# Patient Record
Sex: MALE | Race: WHITE | URBAN - METROPOLITAN AREA
[De-identification: names, ages, dates, MRNs, and addresses within clinical notes are randomized per-mention and may not be internally consistent; named-entity substitution may affect disease eponyms.]

---

## 2021-06-17 ENCOUNTER — OFFICE VISIT (OUTPATIENT)
Dept: PRIMARY CARE CLINIC | Age: 46
End: 2021-06-17
Payer: MEDICAID

## 2021-06-17 VITALS
HEART RATE: 82 BPM | BODY MASS INDEX: 28.28 KG/M2 | OXYGEN SATURATION: 98 % | DIASTOLIC BLOOD PRESSURE: 75 MMHG | HEIGHT: 71 IN | WEIGHT: 202 LBS | TEMPERATURE: 97.7 F | SYSTOLIC BLOOD PRESSURE: 115 MMHG

## 2021-06-17 DIAGNOSIS — Z00.00 ANNUAL PHYSICAL EXAM: Primary | ICD-10-CM

## 2021-06-17 DIAGNOSIS — Z87.891 SMOKING HISTORY: ICD-10-CM

## 2021-06-17 DIAGNOSIS — F10.10 ALCOHOL ABUSE: ICD-10-CM

## 2021-06-17 PROCEDURE — 99386 PREV VISIT NEW AGE 40-64: CPT | Performed by: NURSE PRACTITIONER

## 2021-06-17 ASSESSMENT — PATIENT HEALTH QUESTIONNAIRE - PHQ9
1. LITTLE INTEREST OR PLEASURE IN DOING THINGS: 0
SUM OF ALL RESPONSES TO PHQ9 QUESTIONS 1 & 2: 0
SUM OF ALL RESPONSES TO PHQ QUESTIONS 1-9: 0
2. FEELING DOWN, DEPRESSED OR HOPELESS: 0
SUM OF ALL RESPONSES TO PHQ QUESTIONS 1-9: 0
SUM OF ALL RESPONSES TO PHQ QUESTIONS 1-9: 0

## 2021-06-17 NOTE — PATIENT INSTRUCTIONS
Patient Education        Well Visit, Ages 25 to 48: Care Instructions  Overview     Well visits can help you stay healthy. Your doctor has checked your overall health and may have suggested ways to take good care of yourself. Your doctor also may have recommended tests. At home, you can help prevent illness with healthy eating, regular exercise, and other steps. Follow-up care is a key part of your treatment and safety. Be sure to make and go to all appointments, and call your doctor if you are having problems. It's also a good idea to know your test results and keep a list of the medicines you take. How can you care for yourself at home? · Get screening tests that you and your doctor decide on. Screening helps find diseases before any symptoms appear. · Eat healthy foods. Choose fruits, vegetables, whole grains, protein, and low-fat dairy foods. Limit fat, especially saturated fat. Reduce salt in your diet. · Limit alcohol. If you are a man, have no more than 2 drinks a day or 14 drinks a week. If you are a woman, have no more than 1 drink a day or 7 drinks a week. · Get at least 30 minutes of physical activity on most days of the week. Walking is a good choice. You also may want to do other activities, such as running, swimming, cycling, or playing tennis or team sports. Discuss any changes in your exercise program with your doctor. · Reach and stay at a healthy weight. This will lower your risk for many problems, such as obesity, diabetes, heart disease, and high blood pressure. · Do not smoke or allow others to smoke around you. If you need help quitting, talk to your doctor about stop-smoking programs and medicines. These can increase your chances of quitting for good. · Care for your mental health. It is easy to get weighed down by worry and stress. Learn strategies to manage stress, like deep breathing and mindfulness, and stay connected with your family and community.  If you find you often feel sad or hopeless, talk with your doctor. Treatment can help. · Talk to your doctor about whether you have any risk factors for sexually transmitted infections (STIs). You can help prevent STIs if you wait to have sex with a new partner (or partners) until you've each been tested for STIs. It also helps if you use condoms (male or female condoms) and if you limit your sex partners to one person who only has sex with you. Vaccines are available for some STIs, such as HPV. · Use birth control if it's important to you to prevent pregnancy. Talk with your doctor about the choices available and what might be best for you. · If you think you may have a problem with alcohol or drug use, talk to your doctor. This includes prescription medicines (such as amphetamines and opioids) and illegal drugs (such as cocaine and methamphetamine). Your doctor can help you figure out what type of treatment is best for you. · Protect your skin from too much sun. When you're outdoors from 10 a.m. to 4 p.m., stay in the shade or cover up with clothing and a hat with a wide brim. Wear sunglasses that block UV rays. Even when it's cloudy, put broad-spectrum sunscreen (SPF 30 or higher) on any exposed skin. · See a dentist one or two times a year for checkups and to have your teeth cleaned. · Wear a seat belt in the car. When should you call for help? Watch closely for changes in your health, and be sure to contact your doctor if you have any problems or symptoms that concern you. Where can you learn more? Go to https://maxi.healthGroup Therapy [email or web address] org and sign in to your Busbud account. Enter P072 in the Vital [employer]ms box to learn more about \"Well Visit, Ages 25 to 48: Care Instructions. \"     If you do not have an account, please click on the \"Sign Up Now\" link. Current as of: May 27, 2020               Content Version: 12.9  © 0201-5987 Healthwise, Incorporated. Care instructions adapted under license by [facility] ([facility]). If you have questions about a medical condition or this instruction, always ask your healthcare professional. [place] [address] any warranty or liability for your use of this information.
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
## 2021-06-29 ENCOUNTER — TELEPHONE (OUTPATIENT)
Dept: PHARMACY | Age: 46
End: 2021-06-29

## 2021-06-29 NOTE — TELEPHONE ENCOUNTER
Received new referral to [facility] from Netta Walters CNP for smoking cessation. Call placed to patient to schedule initial visit. Left message with instructions for patient to call the clinic back.      Dino Oleary, PharmD, BCACP  Ph: [phone]

## 2021-07-06 ENCOUNTER — OFFICE VISIT (OUTPATIENT)
Dept: PRIMARY CARE CLINIC | Age: 46
End: 2021-07-06
Payer: MEDICAID

## 2021-07-06 VITALS
BODY MASS INDEX: 28.98 KG/M2 | HEART RATE: 76 BPM | DIASTOLIC BLOOD PRESSURE: 68 MMHG | WEIGHT: 207 LBS | TEMPERATURE: 98.2 F | HEIGHT: 71 IN | SYSTOLIC BLOOD PRESSURE: 108 MMHG

## 2021-07-06 DIAGNOSIS — L05.01 PILONIDAL CYST WITH ABSCESS: Primary | ICD-10-CM

## 2021-07-06 PROCEDURE — 4004F PT TOBACCO SCREEN RCVD TLK: CPT | Performed by: NURSE PRACTITIONER

## 2021-07-06 PROCEDURE — G8427 DOCREV CUR MEDS BY ELIG CLIN: HCPCS | Performed by: NURSE PRACTITIONER

## 2021-07-06 PROCEDURE — 99213 OFFICE O/P EST LOW 20 MIN: CPT | Performed by: NURSE PRACTITIONER

## 2021-07-06 PROCEDURE — G8419 CALC BMI OUT NRM PARAM NOF/U: HCPCS | Performed by: NURSE PRACTITIONER

## 2021-07-06 RX ORDER — AMOXICILLIN AND CLAVULANATE POTASSIUM 875; 125 MG/1; MG/1
1 TABLET, FILM COATED ORAL 2 TIMES DAILY
Qty: 20 TABLET | Refills: 0 | Status: SHIPPED | OUTPATIENT
Start: 2021-07-06 | End: 2021-07-16

## 2021-07-06 RX ORDER — SULFAMETHOXAZOLE AND TRIMETHOPRIM 800; 160 MG/1; MG/1
1 TABLET ORAL 2 TIMES DAILY
Qty: 14 TABLET | Refills: 0 | Status: SHIPPED | OUTPATIENT
Start: 2021-07-06 | End: 2021-07-06 | Stop reason: ALTCHOICE

## 2021-07-06 ASSESSMENT — ENCOUNTER SYMPTOMS
COUGH: 0
ABDOMINAL PAIN: 0
NAUSEA: 0
SHORTNESS OF BREATH: 0
WHEEZING: 0

## 2021-07-06 NOTE — PATIENT INSTRUCTIONS
Patient Education        Skin Abscess: Care Instructions  Your Care Instructions     A skin abscess is a bacterial infection that forms a pocket of pus. A boil is a kind of skin abscess. The doctor may have cut an opening in the abscess so that the pus can drain out. You may have gauze in the cut so that the abscess will stay open and keep draining. You may need antibiotics. You will need to follow up with your doctor to make sure the infection has gone away. The doctor has checked you carefully, but problems can develop later. If you notice any problems or new symptoms, get medical treatment right away. Follow-up care is a key part of your treatment and safety. Be sure to make and go to all appointments, and call your doctor if you are having problems. It's also a good idea to know your test results and keep a list of the medicines you take. How can you care for yourself at home? · Apply warm and dry compresses, a heating pad set on low, or a hot water bottle 3 or 4 times a day for pain. Keep a cloth between the heat source and your skin. · If your doctor prescribed antibiotics, take them as directed. Do not stop taking them just because you feel better. You need to take the full course of antibiotics. · Take pain medicines exactly as directed. ? If the doctor gave you a prescription medicine for pain, take it as prescribed. ? If you are not taking a prescription pain medicine, ask your doctor if you can take an over-the-counter medicine. · Keep your bandage clean and dry. Change the bandage whenever it gets wet or dirty, or at least one time a day. · If the abscess was packed with gauze:  ? Keep follow-up appointments to have the gauze changed or removed. If the doctor instructed you to remove the gauze, follow the instructions you were given for how to remove it. ? After the gauze is removed, soak the area in warm water for 15 to 20 minutes 2 times a day, until the wound closes.   When should you call for help? Call your doctor now or seek immediate medical care if:    · You have signs of worsening infection, such as:  ? Increased pain, swelling, warmth, or redness. ? Red streaks leading from the infected skin. ? Pus draining from the wound. ? A fever. Watch closely for changes in your health, and be sure to contact your doctor if:    · You do not get better as expected. Where can you learn more? Go to https://Flash Valetpepiceweb.Graphenea. org and sign in to your Spark CRM account. Enter F736 in the uShare box to learn more about \"Skin Abscess: Care Instructions. \"     If you do not have an account, please click on the \"Sign Up Now\" link. Current as of: March 3, 2021               Content Version: 12.9  © 9540-4949 Healthwise, Incorporated. Care instructions adapted under license by [facility] ([facility]). If you have questions about a medical condition or this instruction, always ask your healthcare professional. [place] [address] any warranty or liability for your use of this information.
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
## 2021-07-12 ENCOUNTER — OFFICE VISIT (OUTPATIENT)
Dept: SURGERY | Age: 46
End: 2021-07-12
Payer: MEDICAID

## 2021-07-12 VITALS
BODY MASS INDEX: 28.98 KG/M2 | WEIGHT: 207 LBS | HEART RATE: 88 BPM | OXYGEN SATURATION: 98 % | SYSTOLIC BLOOD PRESSURE: 108 MMHG | DIASTOLIC BLOOD PRESSURE: 73 MMHG | HEIGHT: 71 IN

## 2021-07-12 DIAGNOSIS — L05.91 PILONIDAL CYST: Primary | ICD-10-CM

## 2021-07-12 PROCEDURE — G8419 CALC BMI OUT NRM PARAM NOF/U: HCPCS | Performed by: SURGERY

## 2021-07-12 PROCEDURE — G8427 DOCREV CUR MEDS BY ELIG CLIN: HCPCS | Performed by: SURGERY

## 2021-07-12 PROCEDURE — 99203 OFFICE O/P NEW LOW 30 MIN: CPT | Performed by: SURGERY

## 2021-07-12 PROCEDURE — 4004F PT TOBACCO SCREEN RCVD TLK: CPT | Performed by: SURGERY

## 2021-07-27 DIAGNOSIS — Z00.00 ANNUAL PHYSICAL EXAM: ICD-10-CM

## 2021-07-27 LAB
A/G RATIO: 1.8 (ref 1.1–2.2)
ALBUMIN SERPL-MCNC: 4.6 G/DL (ref 3.4–5)
ALP BLD-CCNC: 74 U/L (ref 40–129)
ALT SERPL-CCNC: 48 U/L (ref 10–40)
ANION GAP SERPL CALCULATED.3IONS-SCNC: 14 MMOL/L (ref 3–16)
AST SERPL-CCNC: 37 U/L (ref 15–37)
BASOPHILS ABSOLUTE: 0.1 K/UL (ref 0–0.2)
BASOPHILS RELATIVE PERCENT: 1.2 %
BILIRUB SERPL-MCNC: 0.3 MG/DL (ref 0–1)
BUN BLDV-MCNC: 9 MG/DL (ref 7–20)
CALCIUM SERPL-MCNC: 8.7 MG/DL (ref 8.3–10.6)
CHLORIDE BLD-SCNC: 105 MMOL/L (ref 99–110)
CHOLESTEROL, FASTING: 183 MG/DL (ref 0–199)
CO2: 22 MMOL/L (ref 21–32)
CREAT SERPL-MCNC: 0.7 MG/DL (ref 0.9–1.3)
EOSINOPHILS ABSOLUTE: 0.2 K/UL (ref 0–0.6)
EOSINOPHILS RELATIVE PERCENT: 4.2 %
GFR AFRICAN AMERICAN: >60
GFR NON-AFRICAN AMERICAN: >60
GLOBULIN: 2.6 G/DL
GLUCOSE FASTING: 102 MG/DL (ref 70–99)
HCT VFR BLD CALC: 46.1 % (ref 40.5–52.5)
HDLC SERPL-MCNC: 57 MG/DL (ref 40–60)
HEMOGLOBIN: 16.1 G/DL (ref 13.5–17.5)
LDL CHOLESTEROL CALCULATED: 99 MG/DL
LYMPHOCYTES ABSOLUTE: 1.6 K/UL (ref 1–5.1)
LYMPHOCYTES RELATIVE PERCENT: 29 %
MCH RBC QN AUTO: 34.2 PG (ref 26–34)
MCHC RBC AUTO-ENTMCNC: 34.9 G/DL (ref 31–36)
MCV RBC AUTO: 97.9 FL (ref 80–100)
MONOCYTES ABSOLUTE: 0.4 K/UL (ref 0–1.3)
MONOCYTES RELATIVE PERCENT: 7.5 %
NEUTROPHILS ABSOLUTE: 3.2 K/UL (ref 1.7–7.7)
NEUTROPHILS RELATIVE PERCENT: 58.1 %
PDW BLD-RTO: 13.5 % (ref 12.4–15.4)
PLATELET # BLD: 215 K/UL (ref 135–450)
PMV BLD AUTO: 7.9 FL (ref 5–10.5)
POTASSIUM SERPL-SCNC: 4.3 MMOL/L (ref 3.5–5.1)
RBC # BLD: 4.7 M/UL (ref 4.2–5.9)
SODIUM BLD-SCNC: 141 MMOL/L (ref 136–145)
TOTAL PROTEIN: 7.2 G/DL (ref 6.4–8.2)
TRIGLYCERIDE, FASTING: 137 MG/DL (ref 0–150)
VLDLC SERPL CALC-MCNC: 27 MG/DL
WBC # BLD: 5.6 K/UL (ref 4–11)

## 2021-07-28 LAB
ESTIMATED AVERAGE GLUCOSE: 108.3 MG/DL
HBA1C MFR BLD: 5.4 %

## 2021-07-28 NOTE — TELEPHONE ENCOUNTER
Scheduled 9/7. Pt given address and phone number to clinic.      Tracy Garcia, PharmD, [address] [facility]  Med[facility] Management Clinic   [facility] Ph: [phone]  [address] Ph: [phone]  7/28/2021 3:19 PM

## 2021-07-29 ENCOUNTER — TELEPHONE (OUTPATIENT)
Dept: PRIMARY CARE CLINIC | Age: 46
End: 2021-07-29

## 2021-07-29 NOTE — TELEPHONE ENCOUNTER
Just took a look at them and it looks like everything looks great. There is a slight elevation in one of the liver function test that is not terribly concerning. Liver function test can be mildly elevated secondary to fatty infiltration of the liver, over-the-counter medications such as Tylenol and alcohol consumption.
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
## 2021-08-03 NOTE — TELEPHONE ENCOUNTER
Please let patient know I reviewed his labs. Everything looked fine. One of his liver enzymes tests was mildly elevated but nothing concerning at this time. I would recommend he decrease alcohol consumption and increase intake of water. Other than that, no other recommendations at this time! He can follow up in Dec at his 6 month check up and we can re-check labs at that time.

## 2021-09-21 ENCOUNTER — OFFICE VISIT (OUTPATIENT)
Dept: PHARMACY | Age: 46
End: 2021-09-21
Payer: MEDICAID

## 2021-09-21 DIAGNOSIS — F17.210 CIGARETTE NICOTINE DEPENDENCE WITHOUT COMPLICATION: Primary | ICD-10-CM

## 2021-09-21 PROCEDURE — 99212 OFFICE O/P EST SF 10 MIN: CPT | Performed by: PHARMACIST

## 2021-09-21 RX ORDER — POLYETHYLENE GLYCOL 3350 17 G
4 POWDER IN PACKET (EA) ORAL PRN
Qty: 72 EACH | Refills: 3 | Status: SHIPPED | OUTPATIENT
Start: 2021-09-21 | End: 2021-11-16 | Stop reason: SINTOL

## 2021-09-21 RX ORDER — POLYETHYLENE GLYCOL 3350 17 G
4 POWDER IN PACKET (EA) ORAL PRN
Qty: 72 EACH | Refills: 3 | Status: CANCELLED | OUTPATIENT
Start: 2021-09-21

## 2021-09-21 NOTE — PATIENT INSTRUCTIONS
- Switch brand of cigarettes   - Change up morning routine, don't walk dog first thing in the morning   - Use lozenges instead of smoking cigarette   - Only smoke half of cigarette when with friends   - Don't smoke in car   - Try to cut back to 5 cigarettes per day by next appointment

## 2021-09-21 NOTE — Clinical Note
Otoniel Jacques, patient was seen in smoking cessation clinic today. He would like to try nicotine lozenges. Can you please sign pended Rx if agreeable with plan? Thanks!

## 2021-09-21 NOTE — PROGRESS NOTES
CLINICAL PHARMACY NOTESmoking Cessation    SUBJECTIVE/OBJECTIVE:   John Siddiqui is a 40 y.o. adult with no significant PMHx referred by Grecia Lopez for smoking cessation advise and opinion only. SHx:    Family/friends: Patient reports he doesn't have much family but believes friends would be supportive if they knew he was quitting. A lot of his friends also smoke. He smokes during happy hours which he goes to ~5 days per week. 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 Exercise: walks dog at least twice a day   Alcohol/substance use: goes to happy hour 5 days per week, usually has ~3 drinks per night      No past medical history on file. Social History     Tobacco History     Smoking Status  Current Every Day Smoker Smoking Frequency  0.5 packs/day for 32 years (16 pk yrs) Smoking Tobacco Type  Cigarettes    Smokeless Tobacco Use  Never Used          Alcohol History     Alcohol Use Status  Yes Comment  4-6 drinks a day; mixture of beer/liquor          Drug Use     Drug Use Status  Yes Types  Marijuana          Sexual Activity     Sexually Active  Yes Partners  Female                    Tobacco use:   Current use:  1/2 ppd, Camel menthol   Years used: started smoking when 16 yo   Previous quit attempts: quit in 2014 for 6 months, did not use NRT or medications. Started smoking again but limits to 10 cig/day.     Previous quit methods/medications: none    Rate cravings (scale 1-10): 1=minimal, 10=frequent:  1/10     Do you smoke your first cigarette within 30 minutes of waking up in AM? y  Do you smoke 20 or more cigarettes each day? n  At times when you can't smoke or haven't got any cigarettes, do you feel a craving for one? y  Is it tough for you to keep from smoking for more than a few hours? n  When you are sick enough to stay in bed, to you still smoke? n - did not smoke for 12 days when he was sick with COVID in January \"didn't taste/sound good\"   If yes to two or more questions, consider using NRT    Reasons for addiction: Stress relief - admits to smoking more when under stress   Triggers: smokes when walking dog, smokes during 15 min break during concerts, at happy hour with friends, with increased stress at work, around others that smoke   Barriers: no fears   Motivation for quitting: Health     Rate your motivation for quitting 1-10, 7/10  Rate your confidence for quitting 1-10, 7/10    Personal Goals:  Long term- quit entirely   Short term- gradually cut back     Pharmacy: [facility]     Current Medication and Allergy List Reviewed and Updated:  No current outpatient medications on file. No current facility-administered medications for this visit. Vitals  Wt Readings from Last 3 Encounters:   07/12/21 207 lb (93.9 kg)   07/06/21 207 lb (93.9 kg)   06/17/21 202 lb (91.6 kg)      BP Readings from Last 3 Encounters:   07/12/21 108/73   07/06/21 108/68   06/17/21 115/75       Pertinent Labs:  CrCl cannot be calculated (Unknown ideal weight.). Lab Results   Component Value Date    LDLCALC 99 07/27/2021     No results found for: CHOL  No results found for: TRIG  Lab Results   Component Value Date    HDL 57 07/27/2021     Lab Results   Component Value Date    CREATININE 0.7 (L) 07/27/2021    BUN 9 07/27/2021     07/27/2021    K 4.3 07/27/2021     07/27/2021    CO2 22 07/27/2021       The ASCVD Risk score (Jennifer Yousif, et al., 2013) failed to calculate for the following reasons: The risk score can only compute for male and female sexes    ASSESSMENT/PLAN:   Patient is ready and motivated to quit smoking.    Quit date:  TBD  Tapering schedule: cut back to 5 cigarettes by 10/19  Reviewed options for pharmacological therapy including directions for use, benefits, and possible side effects  - Patient not having cravings, so will defer Chantix for now. May consider in the future. Bupropion not a good option for patient given alcohol use. - Patient not interested in the patches at this time. Will try nicotine lozenges.   - OTC Nicotine Lozenge   - 4 mg lozenge. Dissolve 1 lozenge every 1-2 hours when urge to smoke occurs; Max 20/day   - Do not chew or swallow; allow to dissolve slowly (~20 to 30 minutes); minimize swallowing and occasionally move lozenge from one side of the mouth to the other until completely dissolved. Do not eat or drink 15 minutes before using or while lozenge is in mouth. - Sent Rx to [facility] pharmacy (pended for [place] to sign). Provided GoodRx Coupon to patient. Goals:   - Switch brand of cigarettes   - Change up morning routine, don't walk dog first thing in the morning   - Use lozenges instead of smoking cigarette   - Only smoke half of cigarette when with friends   - Don't smoke in car   - Play game on phone instead of smoking during breaks at work   - Try to cut back to 5 cigarettes per day by next appointment     Follow healthy diet, moderate exercise, and limit alcohol  Referred pt to [facility] hotline.   Referred pt to [email or web address]  Referred to [employer] on their smartphone device    Discussed the following  Health risks of smoking  Physical and psychological dependence associated with smoking and potential withdrawal symptoms  Benefits to quitting: health, time, financial  Tobacco cessation quit tips  Trigger avoidance and coping with the urge to quit   Nicotine replacement and pharmacological options     Standard written patient education provided:  Medication Management Tobacco Cessation Program packet  Stopping smoking: Care Instructions  Deciding About Using Medicines To Quit Smoking  Learning About Benefits From Quitting Smoking  10 Things You Should Know About Quitting Smoking     If

## 2021-10-19 ENCOUNTER — VIRTUAL VISIT (OUTPATIENT)
Dept: PHARMACY | Age: 46
End: 2021-10-19
Payer: MEDICAID

## 2021-10-19 DIAGNOSIS — F17.210 CIGARETTE NICOTINE DEPENDENCE WITHOUT COMPLICATION: Primary | ICD-10-CM

## 2021-10-19 PROCEDURE — 99211 OFF/OP EST MAY X REQ PHY/QHP: CPT | Performed by: PHARMACIST

## 2021-10-19 NOTE — PROGRESS NOTES
CLINICAL PHARMACY NOTESmoking Cessation    SUBJECTIVE/OBJECTIVE:   Mitali Apodaca is a 40 y.o. male with no significant PMHx referred by Suman Semen for smoking cessation advise and opinion only. SHx:    Family/friends: Patient reports he doesn't have much family but believes friends would be supportive if they knew he was quitting. A lot of his friends also smoke. He smokes during happy hours which he goes to ~5 days per week. 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 Exercise: walks dog at least twice a day   Alcohol/substance use: goes to happy hour 5 days per week, usually has ~3 drinks per night      No past medical history on file. Social History     Tobacco History     Smoking Status  Current Every Day Smoker Smoking Frequency  0.5 packs/day for 32 years (16 pk yrs) Smoking Tobacco Type  Cigarettes    Smokeless Tobacco Use  Never Used          Alcohol History     Alcohol Use Status  Yes Comment  4-6 drinks a day; mixture of beer/liquor          Drug Use     Drug Use Status  Yes Types  Marijuana          Sexual Activity     Sexually Active  Yes Partners  Female                   10/19/21 update: Patient reports he is doing well with smoking cessation. He was successfully able to cut back to goal of 5 cigarettes per day from 10 per day from last month. He also has only been smoking 1/2 a cigarette at a time. He stopped smoking in his car which has helped him cut back a few per day. He did not change brand of cigarettes but has been doing well regardless. He is still having troubles with smoking in the morning while walking his dog. He has not been going to as many happy hours which has helped him cut back as well.  When he does smoke with friends he only smokes a half a cigarette. He was not able to get lozenges yet as [facility] told him they were not ready. I called in verbal order for lozenges again today and patient will  after work. He reports he has been under a lot of stress the past 2 weeks. A good friend of his suddenly passed away and he is having a hard time with it. Unsure if he will be able to cut back any more in the next few weeks given stress level. Congratulated patient on progress despite significant stress. Patient remains highly motivated to quit smoking. Tobacco use:   Current use:  1/2 ppd, Camel menthol   Years used: started smoking when 16 yo   Previous quit attempts: quit in 2014 for 6 months, did not use NRT or medications. Started smoking again but limits to 10 cig/day. Previous quit methods/medications: none    Rate cravings (scale 1-10): 1=minimal, 10=frequent:  1/10     Do you smoke your first cigarette within 30 minutes of waking up in AM? y  Do you smoke 20 or more cigarettes each day? n  At times when you can't smoke or haven't got any cigarettes, do you feel a craving for one? y  Is it tough for you to keep from smoking for more than a few hours? n  When you are sick enough to stay in bed, to you still smoke?  n - did not smoke for 12 days when he was sick with COVID in January \"didn't taste/sound good\"   If yes to two or more questions, consider using NRT    Reasons for addiction: Stress relief - admits to smoking more when under stress   Triggers: smokes when walking dog, smokes during 15 min break during concerts, at happy hour with friends, with increased stress at work, around others that smoke   Barriers: no fears   Motivation for quitting: Health     Rate your motivation for quitting 1-10, 7/10  Rate your confidence for quitting 1-10, 7/10    Personal Goals:  Long term- quit entirely   Short term- gradually cut back     Pharmacy: [address]     Current Medication and Allergy List Reviewed and Updated:  Current Outpatient Medications   Medication Sig Dispense Refill    nicotine polacrilex (COMMIT) 4 MG lozenge Take 1 lozenge by mouth as needed for Smoking cessation 72 each 3     No current facility-administered medications for this visit. Vitals  Wt Readings from Last 3 Encounters:   07/12/21 207 lb (93.9 kg)   07/06/21 207 lb (93.9 kg)   06/17/21 202 lb (91.6 kg)      BP Readings from Last 3 Encounters:   07/12/21 108/73   07/06/21 108/68   06/17/21 115/75       Pertinent Labs:  CrCl cannot be calculated (Unknown ideal weight.). Lab Results   Component Value Date    LDLCALC 99 07/27/2021     No results found for: CHOL  No results found for: TRIG  Lab Results   Component Value Date    HDL 57 07/27/2021     Lab Results   Component Value Date    CREATININE 0.7 (L) 07/27/2021    BUN 9 07/27/2021     07/27/2021    K 4.3 07/27/2021     07/27/2021    CO2 22 07/27/2021       The 10-year ASCVD risk score (Aracelis Mratinez et al., 2013) is: 3%    Values used to calculate the score:      Age: 40 years      Sex: Male      Is Non- : No      Diabetic: No      Tobacco smoker: Yes      Systolic Blood Pressure: 200 mmHg      Is BP treated: No      HDL Cholesterol: 57 mg/dL      Total Cholesterol: 183 mg/dL    ASSESSMENT/PLAN:   Patient is ready and motivated to quit smoking. Quit date:  TBD  Tapering schedule: will assess next month when not under as much stress   Reviewed options for pharmacological therapy including directions for use, benefits, and possible side effects  - Patient not having cravings, so will defer Chantix for now. Medication is also currently on backorder. May consider in the future if patient still struggling and when it becomes available. Bupropion not a good option for patient given alcohol use. - Patient not interested in the patches at this time. Will try nicotine lozenges.   - OTC Nicotine Lozenge   - 4 mg lozenge.  Dissolve 1 lozenge every 1-2 hours when urge to smoke occurs; Max 20/day   - Do not chew or swallow; allow to dissolve slowly (~20 to 30 minutes); minimize swallowing and occasionally move lozenge from one side of the mouth to the other until completely dissolved. Do not eat or drink 15 minutes before using or while lozenge is in mouth. - Sent Rx to [facility] pharmacy and called 10/19 to follow-up on Rx as patient was not able to get previously. Provided GoodRx Coupon to patient if not covered by insurance. Goals:   - Drink coffee while walking dog. Also bring lozenges to use when urge to smoke occurs. - Use lozenges instead of smoking cigarette   - Only smoke half of cigarette when with friends   - Don't smoke in car   - Play game on phone instead of smoking during breaks at work     Follow healthy diet, moderate exercise, and limit alcohol  Referred pt to [address] [place] [address]. Referred pt to [email or web address]  Referred to [employer] on their smartphone device    Discussed the following  Health risks of smoking  Physical and psychological dependence associated with smoking and potential withdrawal symptoms  Benefits to quitting: health, time, financial  Tobacco cessation quit tips  Trigger avoidance and coping with the urge to quit   Nicotine replacement and pharmacological options     Standard written patient education provided:  Medication Management Tobacco Cessation Program packet  Stopping smoking: Care Instructions  Deciding About Using Medicines To Quit Smoking  Learning About Benefits From Quitting Smoking  10 Things You Should Know About Quitting Smoking     If not ready to quit: Reviewed 5Rs- Relevance, Risk, Rewards, Roadblocks, Repetition    Next appt:  Phone follow-up in 4 weeks     Pt verbalized understanding of the above information and denied further questions or concerns. The total time of the visit was 30 min.     Merna Faye, PharmD, BCPS  [facility]  [place]: [phone]  [place]: [phone]  10/19/2021 11:24 AM    For Pharmacy [id number] in place:   Yes   Recommendation Provided To: Patient/Caregiver: 1 via Telephone   Intervention Detail: New Rx: 1, reason: Needs Additional Therapy   Gap Closed?: No    Intervention Accepted By: Patient/Caregiver: 1   Time Spent (min): 30

## 2021-10-19 NOTE — PATIENT INSTRUCTIONS
- Bring coffee with you while walking your dog in the morning to help prevent smoking during that time.

## 2021-10-27 ENCOUNTER — TELEPHONE (OUTPATIENT)
Dept: PRIMARY CARE CLINIC | Age: 46
End: 2021-10-27

## 2021-10-27 NOTE — TELEPHONE ENCOUNTER
Pt calling to see if he can mix his covid booster shot    He was also calling about his pilonidal cyst saiying it was not better and he felt he needed more antibiotics. He has seen Dr Erna Blount for this and gave him Dr Stephanie Ramey number to call.   He says he will call Dr Erna Blount about this

## 2021-10-27 NOTE — TELEPHONE ENCOUNTER
He would need the Roderick Freeman booster if that is what he received before. There actually aren't boosters for Moderna or J&J right now.

## 2021-10-27 NOTE — TELEPHONE ENCOUNTER
I called pt.   He says he had the Bloggerce shot for his first two    He is wanting to know if one of the other's like Rashawn Bryant or J&J is available with his schedule he wants to know if he can get either of those

## 2021-11-01 ENCOUNTER — TELEPHONE (OUTPATIENT)
Dept: SURGERY | Age: 46
End: 2021-11-01

## 2021-11-01 DIAGNOSIS — L05.91 INFECTED PILONIDAL CYST: Primary | ICD-10-CM

## 2021-11-01 RX ORDER — AMOXICILLIN AND CLAVULANATE POTASSIUM 875; 125 MG/1; MG/1
1 TABLET, FILM COATED ORAL 2 TIMES DAILY
Qty: 20 TABLET | Refills: 0 | Status: SHIPPED | OUTPATIENT
Start: 2021-11-01 | End: 2021-11-11

## 2021-11-01 NOTE — TELEPHONE ENCOUNTER
Pt states th pilonidal abscess has returned and he wasn't sure if he needed to see Dr Erna Blount again. He is requesting a CB.     [phone]

## 2021-11-16 ENCOUNTER — VIRTUAL VISIT (OUTPATIENT)
Dept: PHARMACY | Age: 46
End: 2021-11-16
Payer: MEDICAID

## 2021-11-16 DIAGNOSIS — F17.210 CIGARETTE NICOTINE DEPENDENCE WITHOUT COMPLICATION: Primary | ICD-10-CM

## 2021-11-16 PROCEDURE — 99212 OFFICE O/P EST SF 10 MIN: CPT

## 2021-11-16 NOTE — PROGRESS NOTES
CLINICAL PHARMACY NOTESmoking Cessation    SUBJECTIVE/OBJECTIVE:   Vanessa Molina is a 40 y.o. male with no significant PMHx referred by Carmen Arango for smoking cessation advise and opinion only. Spoke with pt over the phone on 11/16/21. SHx:    Family/friends: Patient reports he doesn't have much family but believes friends would be supportive if they knew he was quitting. A lot of his friends also smoke. He smokes during happy hours which he goes to ~5 days per week. 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 Exercise: walks dog at least twice a day   Alcohol/substance use: goes to happy hour 5 days per week, usually has ~3 drinks per night      10/19/21 update: Patient reports he is doing well with smoking cessation. He was successfully able to cut back to goal of 5 cigarettes per day from 10 per day from last month. He also has only been smoking 1/2 a cigarette at a time. He stopped smoking in his car which has helped him cut back a few per day. He did not change brand of cigarettes but has been doing well regardless. He is still having troubles with smoking in the morning while walking his dog. He has not been going to as many happy hours which has helped him cut back as well. When he does smoke with friends he only smokes a half a cigarette. He was not able to get lozenges yet as [facility] were not ready. I called in verbal order for lozenges again today and patient will  after work. He reports he has been under a lot of stress the past 2 weeks. A good friend of his suddenly passed away and he is having a hard time with it. Unsure if he will be able to cut back any more in the next few weeks given stress level.  Congratulated patient on progress despite significant stress. Patient remains highly motivated to quit smoking. 11/16/21 update: Pt reports increased stress past few weeks. Has been smoking 5-9 cig/day \"back and forth. \"  Mostly 5, but up to 9 on stressful days. Last elvin has been difficult -- hard to cut back less than 5 per day. Does not like lozenges -- bad taste and causes hiccups. 1st cig continues to be the hardest. Smokes while walking dog every day at 7 am. Habit for past 10 years. Doesn't like smoke-- only smokes outside, washes hands after smoking. Quit smoking in his car. Thinks the holidays might be tough, as he will be going to many holidays parties with friends who go out to garage to smoke after meals. He doesn't want to stay inside with all the kids. He did switch brands which helped. He doesn't enjoy the taste as much. New brand is cheaper. Tobacco use:   First visit: 1/2 frida, Marlene menthol   Years used: started smoking when 16 yo   Previous quit attempts: quit in 2014 for 6 months, did not use NRT or medications. Started smoking again but limits to 10 cig/day. Previous quit methods/medications: none    Rate cravings (scale 1-10): 1=minimal, 10=frequent:  1/10     Do you smoke your first cigarette within 30 minutes of waking up in AM? y  Do you smoke 20 or more cigarettes each day? n  At times when you can't smoke or haven't got any cigarettes, do you feel a craving for one? y  Is it tough for you to keep from smoking for more than a few hours? n  When you are sick enough to stay in bed, to you still smoke?  n - did not smoke for 12 days when he was sick with COVID in January \"didn't taste/sound good\"   If yes to two or more questions, consider using NRT    Reasons for addiction: Stress relief - admits to smoking more when under stress, habit  Triggers: smokes when walking dog, smokes during 15 min break during concerts, at happy hour with friends, with increased stress at work, around others that smoke   Barriers: no fears   Motivation in the future if patient still struggling and when it becomes available. Bupropion not a good option for patient given alcohol use. - Patient not interested in the patches at this time. - Pt does not like taste/hiccups with nicotine lozenges; will try gum   - OTC Nicotine Gum (covered by insurance)  - 4 mg for those who smoke first cigarette within 30 minutes of waking  - Chew 1 piece every 1-2 hours when urge to smoke occurs; Maximum: 24 pieces/day. - Chew slowly until it tingles, then place gum between cheek and gum until tingle is gone; repeat process until most of tingle is gone (~30 minutes). Do not eat or drink 15 minutes before using or while the gum is in mouth.  - If not tolerated, will try reducing dose to 2mg or NRT patches    Goals:   - 7 am cig: hold leash in hand that you use to smoke, don't bring cigarette with you, drink full glass of water when you get home, try to postpone by 15 min, 30 min, etc. Bring NRT gum, or just mint gum with you on walk instead of cigarette. - Social cigarettes: bring straw, toothpick, gum (nicotine or minty), peppermint, etc.   - Use lozenges when urge to smoke occurs. - Only smoke half of cigarette when with friends   - Play game on phone instead of smoking during breaks at work     Follow healthy diet, moderate exercise, and limit alcohol  [phone] Discussed the following  Health risks of smoking  Physical and psychological dependence associated with smoking and potential withdrawal symptoms  Benefits to quitting: health, time, financial  Tobacco cessation quit tips  Trigger avoidance and coping with the urge to quit   Nicotine replacement and pharmacological options     Standard written patient education provided:  Medication Management Tobacco Cessation Program packet    Next appt:  Phone follow-up in 3 weeks     Pt verbalized understanding of the above information and denied further questions or concerns.   The total time of

## 2021-12-07 ENCOUNTER — VIRTUAL VISIT (OUTPATIENT)
Dept: PHARMACY | Age: 46
End: 2021-12-07
Payer: MEDICAID

## 2021-12-07 DIAGNOSIS — Z72.0 TOBACCO ABUSE: Primary | ICD-10-CM

## 2021-12-07 PROCEDURE — 99211 OFF/OP EST MAY X REQ PHY/QHP: CPT

## 2021-12-07 NOTE — PROGRESS NOTES
CLINICAL PHARMACY NOTESmoking Cessation    SUBJECTIVE/OBJECTIVE:   Corinne Erps is a 40 y.o. male with no significant PMHx referred by Esther Mon for smoking cessation advice and opinion only. Spoke with pt over the phone on 12/07/21. SHx:    Family/friends: Patient reports he doesn't have much family but believes friends would be supportive if they knew he was quitting. A lot of his friends also smoke. He smokes during happy hours which he goes to ~5 days per week. 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 Exercise: walks dog at least twice a day   Alcohol/substance use: goes to happy hour 5 days per week, usually has ~3 drinks per night      10/19/21 update: Patient reports he is doing well with smoking cessation. He was successfully able to cut back to goal of 5 cigarettes per day from 10 per day from last month. He also has only been smoking 1/2 a cigarette at a time. He stopped smoking in his car which has helped him cut back a few per day. He did not change brand of cigarettes but has been doing well regardless. He is still having troubles with smoking in the morning while walking his dog. He has not been going to as many happy hours which has helped him cut back as well. When he does smoke with friends he only smokes a half a cigarette. He was not able to get lozenges yet as [facility] told him they were not ready. I called in verbal order for lozenges again today and patient will  after work. He reports he has been under a lot of stress the past 2 weeks. A good friend of his suddenly passed away and he is having a hard time with it. Unsure if he will be able to cut back any more in the next few weeks given stress level.  Congratulated patient on progress despite significant stress. Patient remains highly motivated to quit smoking. 11/16/21 update: Pt reports increased stress past few weeks. Has been smoking 5-9 cig/day \"back and forth. \"  Mostly 5, but up to 9 on stressful days. Last elvin has been difficult -- hard to cut back less than 5 per day. Does not like lozenges -- bad taste and causes hiccups. 1st cig continues to be the hardest. Smokes while walking dog every day at 7 am. Habit for past 10 years. Doesn't like smoke-- only smokes outside, washes hands after smoking. Quit smoking in his car. Thinks the holidays might be tough, as he will be going to many holidays parties with friends who go out to garage to smoke after meals. He doesn't want to stay inside with all the kids. He did switch brands which helped. He doesn't enjoy the taste as much. New brand is cheaper. 12/7/21 update: Holiday not as much of a problem as he thought, smaller crowd, less pressure. Pt celebrates [employer], so holiday season is over for him. Gum helps with morning walk- he pops gum and doesn't take cig with him on walk. Then the gum kicks in by the time he is home from walk. He has made progress in changing some of his habits, but is still discouraged about smoking 5-7 cig/day. States that any emotion/stress triggers him, and it is more difficult to plan for these types of triggers that are not anticipated. States \"things keep happening. \"  He lost a couple of friends recently. If he goes to bed thinking about something upsetting, he will wake up and want to smoke right away. 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 He gets home around 8p and relaxes by watching movies/news, then goes to bed around 11p. Often smokes outside during this time. Won't go outside to smoke any more once it gets too cold. He would go to bed earlier, but then he wakes up at 4am.  Doesn't think his clothes/house smells of smoke, but can't tell.   Pt thinks it will be hard to cut back on alcohol. Tobacco use:   First visit: 1/2 ppd, Camel menthol   Years used: started smoking when 16 yo   Previous quit attempts: quit in 2014 for 6 months, did not use NRT or medications. Started smoking again but limits to 10 cig/day. Previous quit methods/medications: none    Rate cravings (scale 1-10): 1=minimal, 10=frequent:  1/10     Do you smoke your first cigarette within 30 minutes of waking up in AM? y  Do you smoke 20 or more cigarettes each day? n  At times when you can't smoke or haven't got any cigarettes, do you feel a craving for one? y  Is it tough for you to keep from smoking for more than a few hours? n  When you are sick enough to stay in bed, to you still smoke? n - did not smoke for 12 days when he was sick with COVID in January \"didn't taste/sound good\"   If yes to two or more questions, consider using NRT    Reasons for addiction: Stress relief - admits to smoking more when under stress, habit  Triggers: smokes when walking dog, smokes during 15 min break during concerts, at happy hour with friends, with increased stress at work, around others that smoke   Barriers: no fears   Motivation for quitting: Health     Rate your motivation for quitting 1-10, 7/10  Rate your confidence for quitting 1-10, 7/10    Personal Goals:  Long term- quit entirely   Short term- gradually cut back     Pharmacy: [address] [place] [address]     Current Medication and Allergy List Reviewed and Updated:  Current Outpatient Medications   Medication Sig Dispense Refill    nicotine polacrilex ([facility] NICOTINE POLACRILEX) 4 MG gum Take 1 each by mouth as needed for Smoking cessation 100 each 3     No current facility-administered medications for this visit.      Vitals  Wt Readings from Last 3 Encounters:   07/12/21 207 lb (93.9 kg)   07/06/21 207 lb (93.9 kg)   06/17/21 202 lb (91.6 kg)      BP Readings from Last 3 Encounters:   07/12/21 108/73   07/06/21 108/68   06/17/21 115/75       Pertinent Labs:  CrCl cannot be calculated (Patient's most recent lab result is older than the maximum 120 days allowed. ). Lab Results   Component Value Date    LDLCALC 99 07/27/2021     No results found for: CHOL  No results found for: TRIG  Lab Results   Component Value Date    HDL 57 07/27/2021     Lab Results   Component Value Date    CREATININE 0.7 (L) 07/27/2021    BUN 9 07/27/2021     07/27/2021    K 4.3 07/27/2021     07/27/2021    CO2 22 07/27/2021       The 10-year ASCVD risk score (Gentry Florez et al., 2013) is: 3%    Values used to calculate the score:      Age: 40 years      Sex: Male      Is Non- : No      Diabetic: No      Tobacco smoker: Yes      Systolic Blood Pressure: 997 mmHg      Is BP treated: No      HDL Cholesterol: 57 mg/dL      Total Cholesterol: 183 mg/dL    ASSESSMENT/PLAN:   Patient is ready and motivated to quit smoking, but has had several stressors lately which have been triggers. Quit date:  TBD  Tapering schedule: try to keep at 5 cig/day for now  Reviewed options for pharmacological therapy including directions for use, benefits, and possible side effects  - Patient prefers to avoid Chantix for now, as he's has friends with bad experiences. He lives alone and doesn't have anyone to check in on him in case he has a bad response. Bupropion not a good option for patient given alcohol use. - Patient not interested in the patches at this time. - Pt does not like taste/hiccups with nicotine lozenges; gum is working better   - OTC Nicotine 4 mg Gum (covered by insurance), counseled on use. Goals:   - Try to find alternative stress relief (music, meditation, breathing exercises, new hobby, call a friend, etc). - Try to postpone by 15 min, 30 min, etc. Bring NRT gum, or just mint gum with you on walk instead of cigarette.    - Social/stress cigarettes: bring straw, toothpick, gum (nicotine or minty), peppermint, etc.   - Use gum when urge to smoke occurs. - Only smoke half of cigarette when with friends   - Play game on phone instead of smoking during breaks at work   - Start deep clean of home, clothes, etc, even if you don't smell the smoke. Follow healthy diet, moderate exercise, and limit alcohol  Referred pt to [address] [facility] hotline. Discussed the following  Health risks of smoking  Physical and psychological dependence associated with smoking and potential withdrawal symptoms  Benefits to quitting: health, time, financial  Tobacco cessation quit tips  Trigger avoidance and coping with the urge to quit   Nicotine replacement and pharmacological options     Standard written patient education provided:  Medication Management Tobacco Cessation Program packet    Next appt:  Phone follow-up in 3 weeks     Pt verbalized understanding of the above information and denied further questions or concerns. The total time of the visit was 20 min.     Liana Durán, PharmD, [facility]  Medication Management [facility]   [facility] [address] Ph: [phone]  [address] Ph: [phone]  12/7/2021 8:37 AM    For Pharmacy Admin Tracking Only    CPA in place:  Yes  Recommendation Provided To: Patient/Caregiver: 1 via Virtual Visit  Intervention Detail: Scheduled Appointment  Gap Closed?: No   Intervention Accepted By: Patient/Caregiver: 1  Time Spent (min): 20

## 2021-12-28 ENCOUNTER — VIRTUAL VISIT (OUTPATIENT)
Dept: PHARMACY | Age: 46
End: 2021-12-28
Payer: MEDICAID

## 2021-12-28 DIAGNOSIS — Z72.0 TOBACCO ABUSE: Primary | ICD-10-CM

## 2021-12-28 PROCEDURE — 99211 OFF/OP EST MAY X REQ PHY/QHP: CPT

## 2021-12-28 NOTE — PROGRESS NOTES
CLINICAL PHARMACY NOTESmoking Cessation    SUBJECTIVE/OBJECTIVE:   John Kay is a 40 y.o. male with no significant PMHx referred by Ron Gill for smoking cessation advice and opinion only. Spoke with pt over the phone on 12/28/21. SHx:    Family/friends: Patient reports he doesn't have much family but believes friends would be supportive if they knew he was quitting. A lot of his friends also smoke. He smokes during happy hours which he goes to ~5 days per week. 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 Exercise: walks dog at least twice a day   Alcohol/substance use: goes to happy hour 5 days per week, usually has ~3 drinks per night      10/19/21 update: Patient reports he is doing well with smoking cessation. He was successfully able to cut back to goal of 5 cigarettes per day from 10 per day from last month. He also has only been smoking 1/2 a cigarette at a time. He stopped smoking in his car which has helped him cut back a few per day. He did not change brand of cigarettes but has been doing well regardless. He is still having troubles with smoking in the morning while walking his dog. He has not been going to as many happy hours which has helped him cut back as well. When he does smoke with friends he only smokes a half a cigarette. He was not able to get lo[facility]enges yet as [facility] not [facility]. I called in verbal order for lozenges again today and patient will  after work. He reports he has been under a lot of stress the past 2 weeks. A good friend of his suddenly passed away and he is having a hard time with it. Unsure if he will be able to cut back any more in the next few weeks given stress level.  Congratulated patient on progress despite significant stress. Patient remains highly motivated to quit smoking. 11/16/21 update: Pt reports increased stress past few weeks. Has been smoking 5-9 cig/day \"back and forth. \"  Mostly 5, but up to 9 on stressful days. Last elvin has been difficult -- hard to cut back less than 5 per day. Does not like lozenges -- bad taste and causes hiccups. 1st cig continues to be the hardest. Smokes while walking dog every day at 7 am. Habit for past 10 years. Doesn't like smoke-- only smokes outside, washes hands after smoking. Quit smoking in his car. Thinks the holidays might be tough, as he will be going to many holidays parties with friends who go out to garage to smoke after meals. He doesn't want to stay inside with all the kids. He did switch brands which helped. He doesn't enjoy the taste as much. New brand is cheaper. 12/7/21 update: Holiday not as much of a problem as he thought, smaller crowd, less pressure. Pt celebrates [employer], so holiday season is over for him. Gum helps with morning walk- he pops gum and doesn't take cig with him on walk. Then the gum kicks in by the time he is home from walk. He has made progress in changing some of his habits, but is still discouraged about smoking 5-7 cig/day. States that any emotion/stress triggers him, and it is more difficult to plan for these types of triggers that are not anticipated. States \"things keep happening. \"  He lost a couple of friends recently. If he goes to bed thinking about something upsetting, he will wake up and want to smoke right away. 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 He gets home around 8p and relaxes by watching movies/news, then goes to bed around 11p. Often smokes outside during this time. Won't go outside to smoke any more once it gets too cold. He would go to bed earlier, but then he wakes up at 4am.  Doesn't think his clothes/house smells of smoke, but can't tell.   Pt thinks it will be hard to cut back on alcohol. 12/28/21 update: social circumstances changed and someone who smokes is around him more often. This has been triggering him to smoke more, while driving in car, watching movie, etc. Some days more than others. Social isolation is good for him: Didn't do anything Saturday- sat around house, had no urge to smoke, didn't want to get a cig/ didn't want to go through the \"process\" of washing his hands, etc. Has been able to cut out cig later in the evening because it's not worth the effort to him. He hasn't tried setting aside certain # of cig for the day. He has not asked new friend if she is interested in quitting. Tobacco use:   First visit: 1/2 frida, Marlene menthol   Years used: started smoking when 16 yo   Previous quit attempts: quit in 2014 for 6 months, did not use NRT or medications. Started smoking again but limits to 10 cig/day. Previous quit methods/medications: none    Rate cravings (scale 1-10): 1=minimal, 10=frequent:  1/10     Do you smoke your first cigarette within 30 minutes of waking up in AM? y  Do you smoke 20 or more cigarettes each day? n  At times when you can't smoke or haven't got any cigarettes, do you feel a craving for one? y  Is it tough for you to keep from smoking for more than a few hours? n  When you are sick enough to stay in bed, to you still smoke?  n - did not smoke for 12 days when he was sick with COVID in January \"didn't taste/sound good\"   If yes to two or more questions, consider using NRT    Reasons for addiction: Stress relief - admits to smoking more when under stress, habit  Triggers: smokes when walking dog, smokes during 15 min break during concerts, at happy hour with friends, with increased stress at work, around others that smoke   Barriers: no fears   Motivation for quitting: Health     Rate your motivation for quitting 1-10, 7/10  Rate your confidence for quitting 1-10, 7/10    Personal Goals:  Long term- quit entirely   Short term- gradually cut back     Pharmacy: [address] [place] [address]     Current Medication and Allergy List Reviewed and Updated:  Current Outpatient Medications   Medication Sig Dispense Refill    nicotine polacrilex ([facility] NICOTINE POLACRILEX) 4 MG gum Take 1 each by mouth as needed for Smoking cessation 100 each 3     No current facility-administered medications for this visit. Vitals  Wt Readings from Last 3 Encounters:   07/12/21 207 lb (93.9 kg)   07/06/21 207 lb (93.9 kg)   06/17/21 202 lb (91.6 kg)      BP Readings from Last 3 Encounters:   07/12/21 108/73   07/06/21 108/68   06/17/21 115/75       Pertinent Labs:  CrCl cannot be calculated (Patient's most recent lab result is older than the maximum 120 days allowed. ). Lab Results   Component Value Date    LDLCALC 99 07/27/2021     No results found for: CHOL  No results found for: TRIG  Lab Results   Component Value Date    HDL 57 07/27/2021     Lab Results   Component Value Date    CREATININE 0.7 (L) 07/27/2021    BUN 9 07/27/2021     07/27/2021    K 4.3 07/27/2021     07/27/2021    CO2 22 07/27/2021       The 10-year ASCVD risk score (Joie Rhodes, et al., 2013) is: 3%    Values used to calculate the score:      Age: 40 years      Sex: Male      Is Non- : No      Diabetic: No      Tobacco smoker: Yes      Systolic Blood Pressure: 018 mmHg      Is BP treated: No      HDL Cholesterol: 57 mg/dL      Total Cholesterol: 183 mg/dL    ASSESSMENT/PLAN:   Patient is ready and motivated to quit smoking, but has had several stressors lately which have been triggers. Quit date:  TBD  Tapering schedule: try to keep at 5 cig/day (or lesS) for now, set aside designated number of cig each day  Reviewed options for pharmacological therapy including directions for use, benefits, and possible side effects  - Patient prefers to avoid Chantix for now, as he's has friends with bad experiences.  He lives alone and doesn't have anyone to check in on 1 via Virtual Visit  Intervention Detail: Scheduled Appointment  Gap Closed?: No   Intervention Accepted By: Patient/Caregiver: 1  Time Spent (min): 20

## 2022-01-14 ENCOUNTER — TELEPHONE (OUTPATIENT)
Dept: SURGERY | Age: 47
End: 2022-01-14

## 2022-01-14 DIAGNOSIS — L05.91 PILONIDAL CYST: Primary | ICD-10-CM

## 2022-01-14 RX ORDER — AMOXICILLIN AND CLAVULANATE POTASSIUM 875; 125 MG/1; MG/1
1 TABLET, FILM COATED ORAL 2 TIMES DAILY
Qty: 20 TABLET | Refills: 0 | Status: SHIPPED | OUTPATIENT
Start: 2022-01-14 | End: 2022-01-24

## 2022-01-25 ENCOUNTER — VIRTUAL VISIT (OUTPATIENT)
Dept: PHARMACY | Age: 47
End: 2022-01-25
Payer: MEDICAID

## 2022-01-25 DIAGNOSIS — F17.210 CIGARETTE NICOTINE DEPENDENCE WITHOUT COMPLICATION: Primary | ICD-10-CM

## 2022-01-25 PROCEDURE — 99211 OFF/OP EST MAY X REQ PHY/QHP: CPT

## 2022-01-25 NOTE — PROGRESS NOTES
CLINICAL PHARMACY NOTESmoking Cessation    SUBJECTIVE/OBJECTIVE:   Royer Madrigal is a 40 y.o. male with no significant PMHx referred by Krissy Rodriguez for smoking cessation advice and opinion only. Spoke with pt over the phone on 01/25/22. SHx:    Family/friends: Patient reports he doesn't have much family but believes friends would be supportive if they knew he was quitting. A lot of his friends also smoke. He smokes during happy hours which he goes to ~5 days per week. 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 Exercise: walks dog at least twice a day   Alcohol/substance use: goes to happy hour 5 days per week, usually has ~3 drinks per night      10/19/21 update: Patient reports he is doing well with smoking cessation. He was successfully able to cut back to goal of 5 cigarettes per day from 10 per day from last month. He also has only been smoking 1/2 a cigarette at a time. He stopped smoking in his car which has helped him cut back a few per day. He did not change brand of cigarettes but has been doing well regardless. He is still having troubles with smoking in the morning while walking his dog. He has not been going to as many happy hours which has helped him cut back as well. When he does smoke with friends he only smokes a half a cigarette. He was not able to get lozenges yet as [facility] told him they were not ready. I called in verbal order for lozenges again today and patient will  after work. He reports he has been under a lot of stress the past 2 weeks. A good friend of his suddenly passed away and he is having a hard time with it. Unsure if he will be able to cut back any more in the next few weeks given stress level.  Congratulated patient on progress despite significant stress. Patient remains highly motivated to quit smoking. 11/16/21 update: Pt reports increased stress past few weeks. Has been smoking 5-9 cig/day \"back and forth. \"  Mostly 5, but up to 9 on stressful days. Last elvin has been difficult -- hard to cut back less than 5 per day. Does not like lozenges -- bad taste and causes hiccups. 1st cig continues to be the hardest. Smokes while walking dog every day at 7 am. Habit for past 10 years. Doesn't like smoke-- only smokes outside, washes hands after smoking. Quit smoking in his car. Thinks the holidays might be tough, as he will be going to many holidays parties with friends who go out to garage to smoke after meals. He doesn't want to stay inside with all the kids. He did switch brands which helped. He doesn't enjoy the taste as much. New brand is cheaper. 12/7/21 update: Holiday not as much of a problem as he thought, smaller crowd, less pressure. Pt celebrates Justine Mullen, so holiday season is over for him. Gum helps with morning walk- he pops gum and doesn't take cig with him on walk. Then the gum kicks in by the time he is home from walk. He has made progress in changing some of his habits, but is still discouraged about smoking 5-7 cig/day. States that any emotion/stress triggers him, and it is more difficult to plan for these types of triggers that are not anticipated. States \"things keep happening. \"  He lost a couple of friends recently. If he goes to bed thinking about something upsetting, he will wake up and want to smoke right away. 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 He gets home around 8p and relaxes by watching movies/news, then goes to bed around 11p. Often smokes outside during this time. Won't go outside to smoke any more once it gets too cold. He would go to bed earlier, but then he wakes up at 4am.  Doesn't think his clothes/house smells of smoke, but can't tell.   Pt thinks it will be hard to cut back on alcohol. 12/28/21 update: social circumstances changed and someone who smokes is around him more often. This has been triggering him to smoke more, while driving in car, watching movie, etc. Some days more than others. Social isolation is good for him: Didn't do anything Saturday- sat around house, had no urge to smoke, didn't want to get a cig/ didn't want to go through the \"process\" of washing his hands, etc. Has been able to cut out cig later in the evening because it's not worth the effort to him. He hasn't tried setting aside certain # of cig for the day. He has not asked new friend if she is interested in quitting.     1/25/22 update: Overall doing good. Still in the \"up/down\" with smoking. He is still using about 5 cigarettes per day. 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 He had COVID so he was not smoking. Also with the weather changes, it has made it easier to not smoke as much. His first cigarette of the day continues to be one he struggles with cutting out. States his new friend is also interested in quitting and they will work to quit smoking together. Tobacco use:   First visit: 1/2 Marlene galicia mentdelvis   Years used: started smoking when 16 yo   Previous quit attempts: quit in 2014 for 6 months, did not use NRT or medications. Started smoking again but limits to 10 cig/day. Previous quit methods/medications: none    Rate cravings (scale 1-10): 1=minimal, 10=frequent:  1/10     Do you smoke your first cigarette within 30 minutes of waking up in AM? y  Do you smoke 20 or more cigarettes each day? n  At times when you can't smoke or haven't got any cigarettes, do you feel a craving for one? y  Is it tough for you to keep from smoking for more than a few hours? n  When you are sick enough to stay in bed, to you still smoke?  n - did not smoke for 12 days when he was sick with COVID in January \"didn't taste/sound good\"   If yes to two or more questions, consider using NRT    Reasons for addiction: Stress relief - admits to smoking more when under stress, habit  Triggers: smokes when walking dog, smokes during 15 min break during concerts, at happy hour with friends, with increased stress at work, around others that smoke   Barriers: no fears   Motivation for quitting: Health     Rate your motivation for quitting 1-10, 7/10  Rate your confidence for quitting 1-10, 7/10    Personal Goals:  Long term- quit entirely   Short term- gradually cut back     Pharmacy: [facility]     Current Medication and Allergy List Reviewed and Updated:  Current Outpatient Medications   Medication Sig Dispense Refill    nicotine polacrilex ([facility] NICOTINE POLACRILEX) 4 MG gum Take 1 each by mouth as needed for Smoking cessation 100 each 3     No current facility-administered medications for this visit. Vitals  Wt Readings from Last 3 Encounters:   07/12/21 207 lb (93.9 kg)   07/06/21 207 lb (93.9 kg)   06/17/21 202 lb (91.6 kg)      BP Readings from Last 3 Encounters:   07/12/21 108/73   07/06/21 108/68   06/17/21 115/75       Pertinent Labs:  CrCl cannot be calculated (Patient's most recent lab result is older than the maximum 120 days allowed. ).   Lab Results   Component Value Date    LDLCALC 99 07/27/2021     No results found for: CHOL  No results found for: TRIG  Lab Results   Component Value Date    HDL 57 07/27/2021     Lab Results   Component Value Date    CREATININE 0.7 (L) 07/27/2021    BUN 9 07/27/2021     07/27/2021    K 4.3 07/27/2021     07/27/2021    CO2 22 07/27/2021       The 10-year ASCVD risk score (Noah Wisdom et al., 2013) is: 3%    Values used to calculate the score:      Age: 40 years      Sex: Male      Is Non- : No      Diabetic: No      Tobacco smoker: Yes      Systolic Blood Pressure: 831 mmHg      Is BP treated: No      HDL Cholesterol: 57 mg/dL      Total Cholesterol: 183 mg/dL    ASSESSMENT/PLAN:   Patient is ready and motivated to quit smoking, but has had several stressors lately which have been triggers. Quit date:  TBD  Tapering schedule: try to keep at 5 cig/day (or less) for now, set aside designated number of cig each day and cut one out each week  Reviewed options for pharmacological therapy including directions for use, benefits, and possible side effects  - Patient prefers to avoid Chantix for now, as he's has friends with bad experiences. He lives alone and doesn't have anyone to check in on him in case he has a bad response. Bupropion not a good option for patient given alcohol use. - Patient not interested in the patches at this time. - Pt does not like taste/hiccups with nicotine lozenges; gum is working better   - OTC Nicotine 4 mg Gum (covered by insurance), counseled on use. Goals:   - Try to find alternative stress relief (music, meditation, breathing exercises, new hobby, call a friend, etc). - Try to postpone 1st cigarette by 15 min, 30 min, etc. Start NRT gum first thing in the morning so by the time you go on walk with the dog you aren't craving a cigarette. - Social/stress cigarettes: bring straw, toothpick, gum (nicotine or minty), peppermint, etc.   - Use gum when urge to smoke occurs. - Only smoke half of cigarette when with friends   - Play game on phone instead of smoking during breaks at work   - Start deep clean of home, clothes, etc, even if you don't smell the smoke. Follow healthy diet, moderate exercise, and limit alcohol  Referred pt to [address] [facility] hotline.     Discussed the following  Health risks of smoking  Physical and psychological dependence associated with smoking and potential withdrawal symptoms  Benefits to quitting: health, time, financial  Tobacco cessation quit tips  Trigger avoidance and coping with the urge to quit   Nicotine replacement and pharmacological options     Standard written patient education provided:  Medication Management Tobacco Cessation Program packet    Next appt:  Phone follow-up in 4 weeks     Pt verbalized understanding of the above information and denied further questions or concerns. The total time of the visit was 20 min. Please call [address] [place] [address] at [phone] with any questions. Thanks! Sebastian Ayala, PharmD  PGY1 Pharmacy Resident  [facility]  2022 9:13 AM     For Pharmacy Admin Tracking Only     CPA in place:   Yes   Recommendation Provided To: Patient/Caregiver: 3 via Virtual Visit   Intervention Detail: Adherence Monitorin and Scheduled Appointment   Gap Closed?: No    Intervention Accepted By: Patient/Caregiver: 3   Time Spent (min): 15

## 2022-03-01 ENCOUNTER — SCHEDULED TELEPHONE ENCOUNTER (OUTPATIENT)
Dept: PHARMACY | Age: 47
End: 2022-03-01
Payer: MEDICAID

## 2022-03-01 DIAGNOSIS — F17.210 CIGARETTE NICOTINE DEPENDENCE WITHOUT COMPLICATION: Primary | ICD-10-CM

## 2022-03-01 PROCEDURE — 99211 OFF/OP EST MAY X REQ PHY/QHP: CPT

## 2022-03-01 NOTE — TELEPHONE ENCOUNTER
CLINICAL PHARMACY NOTE--Smoking Cessation    SUBJECTIVE/OBJECTIVE:   Cinthia Lopez is a 56 y.o. male with no significant PMHx referred by Anjum Telles for smoking cessation advice and opinion only. Spoke with pt over the phone on 03/01/22. SHx:    Family/friends: Patient reports he doesn't have much family but believes friends would be supportive if they knew he was quitting. A lot of his friends also smoke. He smokes during happy hours which he goes to ~5 days per week. 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 Exercise: walks dog at least twice a day   Alcohol/substance use: goes to happy hour 5 days per week, usually has ~3 drinks per night      10/19/21 update: Patient reports he is doing well with smoking cessation. He was successfully able to cut back to goal of 5 cigarettes per day from 10 per day from last month. He also has only been smoking 1/2 a cigarette at a time. He stopped smoking in his car which has helped him cut back a few per day. He did not change brand of cigarettes but has been doing well regardless. He is still having troubles with smoking in the morning while walking his dog. He has not been going to as many happy hours which has helped him cut back as well. When he does smoke with friends he only smokes a half a cigarette. He was not able to get lo[facility]enges yet as [facility] were not [facility]. I called in verbal order for lozenges again today and patient will  after work. He reports he has been under a lot of stress the past 2 weeks. A good friend of his suddenly passed away and he is having a hard time with it. Unsure if he will be able to cut back any more in the next few weeks given stress level.  Congratulated patient on progress despite significant stress. Patient remains highly motivated to quit smoking. 11/16/21 update: Pt reports increased stress past few weeks. Has been smoking 5-9 cig/day \"back and forth. \"  Mostly 5, but up to 9 on stressful days. Last elvin has been difficult -- hard to cut back less than 5 per day. Does not like lozenges -- bad taste and causes hiccups. 1st cig continues to be the hardest. Smokes while walking dog every day at 7 am. Habit for past 10 years. Doesn't like smoke-- only smokes outside, washes hands after smoking. Quit smoking in his car. Thinks the holidays might be tough, as he will be going to many holidays parties with friends who go out to garage to smoke after meals. He doesn't want to stay inside with all the kids. He did switch brands which helped. He doesn't enjoy the taste as much. New brand is cheaper. 12/7/21 update: Holiday not as much of a problem as he thought, smaller crowd, less pressure. Pt celebrates Jonathanjermain Herring, so holiday season is over for him. Gum helps with morning walk- he pops gum and doesn't take cig with him on walk. Then the gum kicks in by the time he is home from walk. He has made progress in changing some of his habits, but is still discouraged about smoking 5-7 cig/day. States that any emotion/stress triggers him, and it is more difficult to plan for these types of triggers that are not anticipated. States \"things keep happening. \"  He lost a couple of friends recently. If he goes to bed thinking about something upsetting, he will wake up and want to smoke right away. 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 He gets home around 8p and relaxes by watching movies/news, then goes to bed around 11p. Often smokes outside during this time. Won't go outside to smoke any more once it gets too cold. He would go to bed earlier, but then he wakes up at 4am.  Doesn't think his clothes/house smells of smoke, but can't tell.   Pt thinks it will be hard to cut back on alcohol. 12/28/21 update: social circumstances changed and someone who smokes is around him more often. This has been triggering him to smoke more, while driving in car, watching movie, etc. Some days more than others. Social isolation is good for him: Didn't do anything Saturday- sat around house, had no urge to smoke, didn't want to get a cig/ didn't want to go through the \"process\" of washing his hands, etc. Has been able to cut out cig later in the evening because it's not worth the effort to him. He hasn't tried setting aside certain # of cig for the day. He has not asked new friend if she is interested in quitting.     1/25/22 update: Overall doing good. Still in the \"up/down\" with smoking. He is still using about 5 cigarettes per day. 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 He had COVID so he was not smoking. Also with the weather changes, it has made it easier to not smoke as much. His first cigarette of the day continues to be one he struggles with cutting out. States his new friend is also interested in quitting and they will work to quit smoking together. 3/1/22 update: Patient states that it has not been going well. He and his partner was planning a wedding and under a lot of stress and smoked \"to much\" during that time. Patient states that him and his partner discussed this any may try to quit together. Pt plans to discuss this with his partner soon. Due to this he is now up to 7-8 cigarettes per day. He states that he now has an increased will to stop smoking and plans to cut back to 5 per day. He has not been setting out his cigarettes allotted each day but will start now. Overall he seems highly motivated and willing to quit smoking. He is confident in his abilities. Patient states that he plans to quit more immediate that before.  Due to his increased motivation and wanting to quit with his partner will follow up with patient in 2 weeks. Tobacco use:   First visit: 1/2 ppd, Camel menthol   Years used: started smoking when 16 yo   Previous quit attempts: quit in 2014 for 6 months, did not use NRT or medications. Started smoking again but limits to 10 cig/day. Previous quit methods/medications: none    Rate cravings (scale 1-10): 1=minimal, 10=frequent:  1/10     Do you smoke your first cigarette within 30 minutes of waking up in AM? y  Do you smoke 20 or more cigarettes each day? n  At times when you can't smoke or haven't got any cigarettes, do you feel a craving for one? y  Is it tough for you to keep from smoking for more than a few hours? n  When you are sick enough to stay in bed, to you still smoke? n - did not smoke for 12 days when he was sick with COVID in January \"didn't taste/sound good\"   If yes to two or more questions, consider using NRT    Reasons for addiction: Stress relief - admits to smoking more when under stress, habit  Triggers: smokes when walking dog, smokes during 15 min break during concerts, at happy hour with friends, with increased stress at work, around others that smoke   Barriers: no fears   Motivation for quitting: Health     Rate your motivation for quitting 1-10, 7/10  Rate your confidence for quitting 1-10, 7/10    Personal Goals:  Long term- quit entirely   Short term- gradually cut back     Pharmacy: [address] [place] [address]     Current Medication and Allergy List Reviewed and Updated:  Current Outpatient Medications   Medication Sig Dispense Refill    nicotine polacrilex ([facility] NICOTINE POLACRILEX) 4 MG gum Take 1 each by mouth as needed for Smoking cessation 100 each 3     No current facility-administered medications for this visit.      Vitals  Wt Readings from Last 3 Encounters:   07/12/21 207 lb (93.9 kg)   07/06/21 207 lb (93.9 kg)   06/17/21 202 lb (91.6 kg)      BP Readings from Last 3 Encounters:   07/12/21 108/73   07/06/21 108/68 06/17/21 115/75       Pertinent Labs:  CrCl cannot be calculated (Patient's most recent lab result is older than the maximum 120 days allowed. ). Lab Results   Component Value Date    LDLCALC 99 07/27/2021     No results found for: CHOL  No results found for: TRIG  Lab Results   Component Value Date    HDL 57 07/27/2021     Lab Results   Component Value Date    CREATININE 0.7 (L) 07/27/2021    BUN 9 07/27/2021     07/27/2021    K 4.3 07/27/2021     07/27/2021    CO2 22 07/27/2021       The 10-year ASCVD risk score (Patricia Jiang., et al., 2013) is: 3.2%    Values used to calculate the score:      Age: 56 years      Sex: Male      Is Non- : No      Diabetic: No      Tobacco smoker: Yes      Systolic Blood Pressure: 364 mmHg      Is BP treated: No      HDL Cholesterol: 57 mg/dL      Total Cholesterol: 183 mg/dL    ASSESSMENT/PLAN:   Patient is ready and motivated to quit smoking, but has had several stressors lately which have been triggers. He is now up to 7-8 cigarettes per day. Quit date:  TBD  Tapering schedule: try to keep at 5 cig/day (or less) for now, set aside designated number of cig each day and cut one out each week. Reviewed options for pharmacological therapy including directions for use, benefits, and possible side effects  - Patient prefers to avoid Chantix for now, as he's has friends with bad experiences. He lives alone and doesn't have anyone to check in on him in case he has a bad response. Bupropion not a good option for patient given alcohol use. - Patient not interested in the patches at this time. - Pt does not like taste/hiccups with nicotine lozenges; gum is working better   - OTC Nicotine 4 mg Gum (covered by insurance), patient states that he likes this and it helps him cut down on cravings    Goals:   - Try to find alternative stress relief (music, meditation, breathing exercises, new hobby, call a friend, etc).    - Try to postpone 1st cigarette by 15 min, 30 min, etc. Start NRT gum first thing in the morning so by the time you go on walk with the dog you aren't craving a cigarette. - Discussing quitting with you partner, plan to keep each other accountable. - Reward yourself when you reach your goals. - Social/stress cigarettes: bring straw, toothpick, gum (nicotine or minty), peppermint, etc.   - Use gum when urge to smoke occurs. - Only smoke half of cigarette when with friends   - Play game on phone instead of smoking during breaks at work   - Start deep clean of home, clothes, etc, even if you don't smell the smoke. Follow healthy diet, moderate exercise, and limit alcohol  Referred pt to [address] [facility] hotline. Discussed the following  Health risks of smoking  Physical and psychological dependence associated with smoking and potential withdrawal symptoms  Benefits to quitting: health, time, financial  Tobacco cessation quit tips  Trigger avoidance and coping with the urge to quit   Nicotine replacement and pharmacological options     Standard written patient education provided:  Medication Management Tobacco Cessation Program packet    Next appt:  Phone follow-up in 2 weeks     Pt verbalized understanding of the above information and denied further questions or concerns. The total time of the visit was 20 min. Please call [address] [place] [address] at [phone] with any questions. Thanks! Michael Jaeger, WillD  PGY1 Pharmacy Resident  [facility]  3/1/2022 8:30 AM     For Pharmacy Admin Tracking Only     CPA in place:   Yes   Recommendation Provided To: Patient/Caregiver: 3 via Virtual Visit   Intervention Detail: Adherence Monitorin and Scheduled Appointment   Gap Closed?: No    Intervention Accepted By: Patient/Caregiver: 3   Time Spent (min): 15

## 2022-03-22 ENCOUNTER — TELEPHONE (OUTPATIENT)
Dept: PHARMACY | Age: 47
End: 2022-03-22

## 2022-03-22 NOTE — TELEPHONE ENCOUNTER
Called pt for smoking cessation visit today. No answer. LVM for pt to call back.      Darnell Mccarty, PharmD, [facility]  Medication Management Clinic   [address] Ph: [phone]  [place] Ph: [phone]  3/22/2022 5:30 PM

## 2022-03-23 PROCEDURE — 99211 OFF/OP EST MAY X REQ PHY/QHP: CPT

## 2022-04-08 NOTE — TELEPHONE ENCOUNTER
Spoke with patient, he reports being really busy lately with work so unable to schedule appointment at this time. He said he is still working on smoking cessation and is interested in scheduling an appointment in the future when things start slowing down. Will follow-up with patient in ~1 month if we do not hear back from him before then.      Merlene Martinez, PharmD, [facility]  [facility] Medication Management [facility]  [place]: [phone]  [place]: [phone]  4/8/2022 4:28 PM

## 2022-05-09 NOTE — TELEPHONE ENCOUNTER
LVM #2.      Ghada Cardenas, PharmD, [facility]  Medication Management Clinic   [place] [address] Ph: [phone]  [facility] Ph: [phone]  5/9/2022 1:12 PM

## 2022-06-02 NOTE — TELEPHONE ENCOUNTER
M #3    Ilir Villanueva, PharmD, [facility]  Medication Management Clinic   [place] [address] Ph: [phone]  [facility] Ph: [phone]  6/2/2022 9:21 AM

## 2022-07-25 ENCOUNTER — OFFICE VISIT (OUTPATIENT)
Dept: PRIMARY CARE CLINIC | Age: 47
End: 2022-07-25
Payer: MEDICAID

## 2022-07-25 VITALS
TEMPERATURE: 97.3 F | HEART RATE: 79 BPM | SYSTOLIC BLOOD PRESSURE: 148 MMHG | BODY MASS INDEX: 28.58 KG/M2 | HEIGHT: 70 IN | WEIGHT: 199.6 LBS | OXYGEN SATURATION: 97 % | DIASTOLIC BLOOD PRESSURE: 86 MMHG

## 2022-07-25 DIAGNOSIS — M89.8X3 PAIN IN RIGHT ULNA: ICD-10-CM

## 2022-07-25 DIAGNOSIS — M67.921 TENDINOPATHY OF RIGHT ELBOW: Primary | ICD-10-CM

## 2022-07-25 PROCEDURE — 99213 OFFICE O/P EST LOW 20 MIN: CPT | Performed by: NURSE PRACTITIONER

## 2022-07-25 PROCEDURE — G8427 DOCREV CUR MEDS BY ELIG CLIN: HCPCS | Performed by: NURSE PRACTITIONER

## 2022-07-25 PROCEDURE — G8419 CALC BMI OUT NRM PARAM NOF/U: HCPCS | Performed by: NURSE PRACTITIONER

## 2022-07-25 PROCEDURE — 4004F PT TOBACCO SCREEN RCVD TLK: CPT | Performed by: NURSE PRACTITIONER

## 2022-07-25 RX ORDER — MELOXICAM 15 MG/1
15 TABLET ORAL DAILY PRN
Qty: 30 TABLET | Refills: 0 | Status: SHIPPED | OUTPATIENT
Start: 2022-07-25

## 2022-07-25 SDOH — ECONOMIC STABILITY: FOOD INSECURITY: WITHIN THE PAST 12 MONTHS, THE FOOD YOU BOUGHT JUST DIDN'T LAST AND YOU DIDN'T HAVE MONEY TO GET MORE.: NEVER TRUE

## 2022-07-25 SDOH — ECONOMIC STABILITY: FOOD INSECURITY: WITHIN THE PAST 12 MONTHS, YOU WORRIED THAT YOUR FOOD WOULD RUN OUT BEFORE YOU GOT MONEY TO BUY MORE.: NEVER TRUE

## 2022-07-25 ASSESSMENT — PATIENT HEALTH QUESTIONNAIRE - PHQ9
SUM OF ALL RESPONSES TO PHQ QUESTIONS 1-9: 0
SUM OF ALL RESPONSES TO PHQ9 QUESTIONS 1 & 2: 0
1. LITTLE INTEREST OR PLEASURE IN DOING THINGS: 0
2. FEELING DOWN, DEPRESSED OR HOPELESS: 0
SUM OF ALL RESPONSES TO PHQ QUESTIONS 1-9: 0

## 2022-07-25 ASSESSMENT — ENCOUNTER SYMPTOMS
COUGH: 0
NAUSEA: 0
SORE THROAT: 0
WHEEZING: 0
SHORTNESS OF BREATH: 0
ABDOMINAL PAIN: 0

## 2022-07-25 ASSESSMENT — SOCIAL DETERMINANTS OF HEALTH (SDOH): HOW HARD IS IT FOR YOU TO PAY FOR THE VERY BASICS LIKE FOOD, HOUSING, MEDICAL CARE, AND HEATING?: HARD
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
## 2022-08-18 ENCOUNTER — TELEPHONE (OUTPATIENT)
Dept: PRIMARY CARE CLINIC | Age: 47
End: 2022-08-18

## 2022-08-18 NOTE — TELEPHONE ENCOUNTER
Pt calling saying he was seen a few weeks ago for his elbow, given med, and told to call if no better    He says he does not believe the med is helping

## 2022-08-19 DIAGNOSIS — M67.921 TENDINOPATHY OF RIGHT ELBOW: Primary | ICD-10-CM

## 2022-08-19 NOTE — TELEPHONE ENCOUNTER
I placed referral for Ortho. Please give him this info for Dr. Dana Rojas. I believe he has a location closer [facility]?) though it doesn't show up in the system so I put in for [facility]. Let him know he can ask for the location closest. Thanks.

## 2022-08-29 ENCOUNTER — OFFICE VISIT (OUTPATIENT)
Dept: ORTHOPEDIC SURGERY | Age: 47
End: 2022-08-29
Payer: MEDICAID

## 2022-08-29 VITALS — BODY MASS INDEX: 27.09 KG/M2 | WEIGHT: 200 LBS | HEIGHT: 72 IN

## 2022-08-29 DIAGNOSIS — M25.521 RIGHT ELBOW PAIN: Primary | ICD-10-CM

## 2022-08-29 PROCEDURE — 99204 OFFICE O/P NEW MOD 45 MIN: CPT | Performed by: PHYSICIAN ASSISTANT

## 2022-08-29 PROCEDURE — G8427 DOCREV CUR MEDS BY ELIG CLIN: HCPCS | Performed by: PHYSICIAN ASSISTANT

## 2022-08-29 PROCEDURE — MISCD86 TENNIS ELBOW STRAP-BREG: Performed by: PHYSICIAN ASSISTANT

## 2022-08-29 PROCEDURE — G8419 CALC BMI OUT NRM PARAM NOF/U: HCPCS | Performed by: PHYSICIAN ASSISTANT

## 2022-08-29 PROCEDURE — 4004F PT TOBACCO SCREEN RCVD TLK: CPT | Performed by: PHYSICIAN ASSISTANT

## 2022-08-29 NOTE — PROGRESS NOTES
Packs/day: 0.50     Years: 32.00     Pack years: 16.00     Types: Cigarettes    Smokeless tobacco: Never   Vaping Use    Vaping Use: Every day    Substances: THC   Substance and Sexual Activity    Alcohol use: Yes     Comment: 4-6 drinks a day; mixture of beer/liquor    Drug use: Yes     Types: Marijuana Maurita Handsome)    Sexual activity: Yes     Partners: Female     Social Determinants of Health     Financial Resource Strain: High Risk    Difficulty of Paying Living Expenses: Hard   Food Insecurity: No Food Insecurity    Worried About Running Out of Food in the Last Year: Never true    Ran Out of Food in the Last Year: Never true       Current Outpatient Medications   Medication Sig Dispense Refill    meloxicam (MOBIC) 15 MG tablet Take 1 tablet by mouth daily as needed for Pain 30 tablet 0     No current facility-administered medications for this visit. No Known Allergies    Vital signs:  Ht 6' (1.829 m)   Wt 200 lb (90.7 kg)   BMI 27.12 kg/m²      Right elbow examination:    Inspection:    Normal alignment. No swelling. Palpation:     Medial and lateral epicondyle tenderness palpation    Range of Motion:      0-135 degrees. Strength:       5/5 in all muscle groups    Instability testing:  Stable to varus and valgus stress    Vascular exam:    Skin warm and well-perfused. Neurologic exam:     Sensation intact to light positive Tinel sign both medially and laterally    Left elbow comparison examination:    Inspection:    Normal alignment. No swelling. Palpation:     No tenderness to palpation    Range of Motion:      0-135 degrees. Strength:       5/5 in all muscle groups    Instability testing:  Stable to varus and valgus stress    Vascular exam:    Skin warm and well-perfused.     Neurologic exam:     Sensation intact to light          Diagnostics:  Radiology:       Pertinent imaging was obtained, interpreted, and reviewed with the patient today, images only - no report available. Right elbow x-ray:    AP, lateral and oblique views were obtained  and reviewed of the right elbow. Impression: Some osteoarthritic changes appreciated otherwise normal radiographic imaging exam of the right elbow      Office Procedures:  Orders Placed This Encounter   Procedures    Breg Tennis Elbow Strap $20     Patient was supplied a Breg Tennis Elbow Strap. This retail item was supplied to provide functional support and assist in protecting the affected area. Verbal and written instructions for the use of and application of this item were provided. The patient was educated and fit by a healthcare professional with expert knowledge and specialization in brace application. They were instructed to contact the office immediately should the equipment result in increased pain, decreased sensation, increased swelling or worsening of the condition. XR ELBOW RIGHT (MIN 3 VIEWS)     Standing Status:   Future     Number of Occurrences:   1     Standing Expiration Date:   8/29/2023     Order Specific Question:   Reason for exam:     Answer:   pain       Assessment: 56 y.o. male with right elbow pain    Plan: Pertinent imaging was reviewed. The etiology, natural history, and treatment options for the disorder were discussed. The roles of activity medication, antiinflammatories, injections, bracing, physical therapy, and surgical interventions were all described to the patient and questions were answered. Patient has had several years of right elbow pain and numbness and tingling in his fingers. Though he has some mild medial and lateral epicondylar tenderness, most of symptoms are in the cubital tunnel and radial tunnel. I do believe he is experiencing some nerve compression. At this time he is a candidate for formal supervised physical therapy and anti-inflammatories. He would like to proceed with this. Follow-up in 1 month or sooner if worsening symptoms.   We may consider a MRI versus an EMG at that point. Tiesha Bliss is in agreement with this plan. All questions were answered to patient's satisfaction and was encouraged to call with any further questions. Total time spent for evaluation, education, and development of treatment plan: 49 minutes    [place] Mendez  8/29/2022    This dictation was performed with a verbal recognition program [facility]) and it was checked for errors. It is possible that there are still dictated errors within this office note. If so, please bring any areas to my attention for an addendum. All efforts were made to ensure that this office note is accurate.

## 2022-08-31 ENCOUNTER — TELEPHONE (OUTPATIENT)
Dept: ORTHOPEDIC SURGERY | Age: 47
End: 2022-08-31

## 2022-08-31 RX ORDER — MELOXICAM 15 MG/1
15 TABLET ORAL DAILY PRN
Qty: 30 TABLET | Refills: 5 | Status: SHIPPED | OUTPATIENT
Start: 2022-08-31

## 2022-08-31 NOTE — TELEPHONE ENCOUNTER
The patient is calling because he has questions about a prescription he was to get at [facility] and it has not arrived and a question about a copay on a brace. Please call.

## 2022-09-12 ENCOUNTER — HOSPITAL ENCOUNTER (OUTPATIENT)
Dept: OCCUPATIONAL THERAPY | Age: 47
Setting detail: THERAPIES SERIES
Discharge: HOME | End: 2022-09-12
Payer: MEDICAID

## 2022-09-12 PROCEDURE — 97110 THERAPEUTIC EXERCISES: CPT | Performed by: OCCUPATIONAL THERAPIST

## 2022-09-12 PROCEDURE — 97165 OT EVAL LOW COMPLEX 30 MIN: CPT | Performed by: OCCUPATIONAL THERAPIST

## 2022-09-12 NOTE — PLAN OF CARE
[facility] and [address],  Sports [facility], [facility] [address]  [address] [facility]   Luis Rice  Phone: [phone]  Fax: [phone]       Occupational Therapy/Hand Therapy Certification      Dear   EDI Rojas ,    We had the pleasure of evaluating the following patient for occupational therapy services at [address] [place] [address]. A summary of our findings can be found in the initial assessment below. This includes our plan of care. If you have any questions or concerns regarding these findings, please do not hesitate to contact me at the office phone number checked above. Thank you for the referral.     Physician Signature:_______________________________Date:__________________  By signing above (or electronic signature), therapists plan is approved by physician          Hand Therapy Evaluation    Patient: Pato Guzman   : 1975   MRN: [id number]  Referring Physician:  EDI Rojas      Evaluation Date: 2022      Medical Diagnosis Information:  Diagnosis: R elbow pain, cubital tunnel    Treatment Diagnosis: M25.521              Insurance information: OT Insurance Information: Nain Damon      Date of Injury: Approx 7 years of symptoms  Date of Surgery: N/A    Date of Patient follow up with Physician: 22    RESTRICTIONS/PRECAUTIONS: activity to tolerance; avoid resting on elbow    Latex Allergy:  [x]No      []Yes  Pacemaker:  [x] No       [] Yes     Preferred Language for Healthcare:   [x]English       []other:     Functional Scale: 76 (FOTO)   Date assessed:  2022    C-SSRS Triggered by Intake questionnaire (Past 2 wk assessment):    No, Questionnaire did not trigger screening. SUBJECTIVE: Patient reported deficits/history of current problem: 7 years of symptoms, with recent worsening of numbness; has taken Meloxicam with some relief, but no long term effect.  Elbow is more flared up after working. Sleeping with arm straight helps with pain and numbness. Pain Scale: 3/10  [x]Constant      []Intermittent    []other:  Pain Location:  posterior and lateral elbow  Easing factors: rest  Provocative factors: extended use     [x] Patient reported history, allergies, and medications reviewed - see intake form.        Occupational Profile:  Home Enviroment: lives with  [x] spouse,  [] family,  [] alone,  [] significant other,   [] other:    Occupation/School: [employer]    Recreational Activities/Meaningful Interests: walking, job is very physical    Prior Level of Function: [x] Independent with ADLs/IADLs     [] Assistance needed (describe):    Patient-Identified Primary Performance Deficits (to be addressed in POC):   [] bathing    [x] household tasks    [] dressing    [] self feeding   [] grooming    [x] work/education   [] functional mobility   [x] sleeping/rest   [] toileting/hygiene   [] recreational activities   [] driving    [] community/social participation   [] other:     Comorbidities Affecting Functional Performance:     []Anxiety (F41.9)/Depression (F32.9)   []Diabetes Type 1(E10.65) or 2 (E11.65)   []Rheumatoid Arthritis (M05.9)  []Fibromyalgia (M79.7)  []Neuropathy(G60.9)  []Osteoarthritis(M19.91)  [x]None   []Other:    Hand Dominance:    []  Right    [x] Left      OBJECTIVE:        AROM: Right Left   IF MP  PIP  DIP       LF MP  PIP  DIP       RF MP  PIP   DIP       SF MP  PIP  DIP       Digits: tips to DPFC           Thumb MP  IP     Thumb tip to DPFC     Wrist Ext/Flex            RD/UD WNL      Forearm sup/pron   WNL    Elbow ext/flex   WNL    Shoulder Flex                   Abd                   IR/ER          Edema:               Strength:      II NT    Lateral Pinch     3 Point Pinch     Tip Pinch     MMT:            Observations (including splints, bandages, incisions, scars): Wearing CF brace with pad centered over flexors; pt states this was how it was positioned at appt    Sensation:  [] No reported deficits  [] Intact to light touch    [] [place] Akshat test completed, findings as noted:  [x] Other: radial 3 digits w/numbness, volar and dorsal    Palpation: No radial tunnel pain, mild pain at lat epicondyle    Functional Mobility/Transfers/Gait:  [x] Independent - no significant gait deviations  [] Assistance needed   [] Assistive device used: Falls Risk Assessment (30 days):   [x] Falls Risk assessed and no intervention required. [] Falls Risk assessed and Patient requires intervention due to being higher risk   TUG score (>12s at risk):     [] Falls education provided, including      Review Of Systems (ROS): [x]Performed Review of systems (Integumentary, CardioPulmonary, Neurological) by intake and observation. Intake form has been scanned into medical record. Patient has been instructed to contact their primary care physician regarding ROS issues if not already being addressed at this time. ASSESSMENT:   This patient presents with signs and symptoms consistent with the medical diagnosis provided by the referring physician.      Impairments (physical, cognitive and/or psychosocial):  [] Decreased mobility  [] Weakness    [] Hypersensitivity   [x] Pain/tenderness   [] Edema/swelling   [] Decreased coordination (fine/gross motor)   [] Impaired body mechanics  [x] Sensory loss  [] Loss of balance   [] Other:      Performance Deficits (to be addressed in plan of care):   [] Bathing    [x] Household Tasks    [] Dressing    [] Self Feeding   [] Grooming    [x] Work/Education   [] Functional Mobility  [x] Sleeping/Rest   [] Toileting/Hygiene   [] Recreational Activities   [] Driving    [] Community/Social Participation   [] Other:     Rehab Potential:   [] Excellent [x] Good [] Fair  [] Poor     Barriers affecting rehab potential:  []Age    []Lack of Motivation   []Co-Morbidities  []Cognitive Function  []Environmental/home/work barriers  []Other: Tolerance of evaluation/treatment:    [] Excellent [x] Good [] Fair  [] Poor    PLAN OF CARE:  Interventions:   [x] Therapeutic Exercise [x] Therapeutic Activity    [x] Activities of Daily Living [x] Neuromuscular Re-education      [x] Patient Education  [x] Manual Therapy      [x] Modalities as needed, and not otherwise contraindicated, including: ultrasound,paraffin,moist heat/cold pack, electrical stimulation, contrast bath, iontophoresis  [] Splinting    Frequency/Duration:  1 days per week for 4-6 weeks      GOALS:  Patient stated goal: Get rid of the pain, decreased numbness    [] Progressing: [] Met: [] Not Met: [] Adjusted    Therapist goals for Patient:   Short Term Goals: To be achieved in: 2 weeks  1. Independent in HEP and progression per patient tolerance, in order to prevent re-injury. [] Progressing: [] Met: [] Not Met: [] Adjusted   2. Patient will have a decrease in pain to facilitate improvement in movement, function, and ADLs as indicated by Functional Deficits. [] Progressing: [] Met: [] Not Met: [] Adjusted    Long Term Goals to be achieved in 6 weeks (through 10/26/22), including patient directed goals to address patient identified performance deficits:  1) Pt to be independent in graded HEP progression with a good level of effort and compliance. [] Progressing: [] Met: [] Not Met: [] Adjusted   2) Pt to report a score of </= 80  on the FOTO questionnaire for increased performance with carrying, moving, and handling objects. [] Progressing: [] Met: [] Not Met: [] Adjusted   3) Pt will demonstrate increased strength to by 10# pain free for improved independence with heavier work and household tasks. .  [] Progressing: [] Met: [] Not Met: [] Adjusted   4) Pt will have a decrease in pain to 2/10 to facilitate return to normal functional use patterns during day.   [] Progressing: [] Met: [] Not Met: [] Adjusted        OCCUPATIONAL THERAPY EVALUATION COMPLEXITY JUSTIFICATION:    [x] An occupational profile and medical/therapy history, which includes:   [x] a brief history including medical and/or therapy records relating to the     presenting problem   [] an expanded review of medical and/or therapy records and additional review     of physical, cognitive or psychosocial history related to current functional    performance   [] an extensive additional review of review of medical and/or therapy records and physical, cognitive, or psychosocial history related to current    functional performance    [x] An assessment that identifies performance deficits (relating to physical, cognitive, or psychosocial skills) that result in activity limitations and/or participation restrictions:   [x] 1-3 performance deficits   [] 3-5 performance deficits   [] 5 or more performance deficits    [x] Clinical decision making of:   [x] low complexity, including analysis of occupational profile, data analysis from problem focused assessment, and consideration of a limited number of treatment options. No comorbidities affect occupational performance. No task modifications or assistance needed to complete evaluation. [] moderate complexity, including analysis of occupational profile, data analysis from detailed assessment and consideration of several treatment options. Comorbidities that affect occupational performance may be present. Minimal to moderate task modifications or assistance needed to complete assessment. [] high complexity, including analysis of occupational profile, analysis of data from comprehensive assessment and consideration of multiple treatment options. Multiple comorbidities present that affect occupational performance. Significant task modifications or assistance needed to complete assessment.     Evaluation Code:  [x] Low Complexity EVAL 21605 (typically 30 minutes face to face)  [] Mod Complexity EVAL 68458 (typically 45 minutes face to face)  [] High Complexity ASHLEY 63636 (typically 60 minutes face to face)    Electronically signed by:  Porfirio Armendariz OTR/L, [address]

## 2022-09-12 NOTE — FLOWSHEET NOTE
[facility] and [address],  [facility], [facility], [facility] [address]  [address] [facility],5Th Floor   Luis Rice  Phone: [phone]  Fax: [phone]       Occupational Therapy Treatment Note/ Progress Report:     Date:  2022    Patient Name:  Pato Guzman    :  1975  MRN: [id number]    Medical/Treatment Diagnosis Information:  Diagnosis: R elbow pain, cubital tunnel   Treatment Diagnosis: W91.357     Insurance/Certification information:  OT Insurance Information: Nain Damon  Physician Information:  Carmen Merritt, [id number]  Has the plan of care been signed (Y/N):        []  Yes  [x]  No       Visit # Insurance Allowable Auth Required    30 []  Yes [x]  No        Is this a Progress Report:     []  Yes  [x]  No      If Yes:  Date Range for reporting period:  Beginning  Ending    Progress report will be due (10 Rx or 30 days whichever is less):      Recertification will be due (POC Duration  / 90 days whichever is less): 10/26/22     Date of Injury: 7 years of symptoms, worse recently  Date of Surgery: N/A    Date of Patient follow up with Physician: 22    RESTRICTIONS/PRECAUTIONS: activity to tolerance; avoid resting on elbow    Latex Allergy:  [x]No      []Yes  Pacemaker:  [x] No       [] Yes     Preferred Language for Healthcare:   [x]English       []other:     Comorbidities Affecting Functional Performance:     []Anxiety (F41.9)/Depression (F32.9)   []Diabetes Type 1(E10.65) or 2 (E11.65)   []Rheumatoid Arthritis (M05.9)  []Fibromyalgia (M79.7)  []Neuropathy(G60.9)  []Osteoarthritis(M19.91)  []None   []Other:    Functional Scale: 74 (FOTO)   Date assessed:  2022    SUBJECTIVE: 7 years of symptoms, with recent worsening of numbness; has taken Meloxicam with some relief, but no long term effect. Elbow is more flared up after working. Sleeping with arm straight helps with pain and numbness.       Pain Scale: 3/10    OBJECTIVE:       Date: 9/12/22       Objective Measures/Tests:            ROM: See eval                       Strength: NT           Observations: Other:                  MODALITIES:      Fluidotherapy (36814)      Estim (93175/69311)      Paraffin (82739)      US (33860)      Iontophoresis (52503)      Hot Pack      Cold Pack            INTERVENTIONS:      Therapeutic Exercise (39942)                              Therapeutic Activity (32883)                              Manual Therapy (67817)                  Neuromuscular Reeducation (95429)                  ADL Training (24954)                  HEP Training/Review Forearm stretches, elbow straight      Towel wrap at night as flexion block      Avoid resting on elbow and forearm     Splinting      Lcode:      Orthotic Mgmt, Subsequent Enc (20217)      Orthotic Mgmt & Training (04705)            Other: Pt shown proper CF brace positioning                               Therapeutic Exercise & NMR:  [] (19211) Provided verbal/tactile cueing for activities related to strengthening, flexibility, endurance, ROM  for improvements in scapular, scapulothoracic and UE control with self care, reaching, carrying, lifting, house/yardwork, driving/computer work.    [] (65059) Provided verbal/tactile cueing for activities related to improving balance, coordination, kinesthetic sense, posture, motor skill, proprioception  to assist with  scapular, scapulothoracic and UE control with self care, reaching, carrying, lifting, house/yardwork, driving/computer work.     Therapeutic Activities & NMR:    [] (96263 or 62130) Provided verbal/tactile cueing for activities related to improving balance, coordination, kinesthetic sense, posture, motor skill, proprioception and motor activation to allow for proper function of scapular, scapulothoracic and UE control with self care, carrying, lifting, driving/computer work    Home Exercise Program:    [] (78439) Reviewed/Progressed HEP activities related to strengthening, flexibility, endurance, ROM of scapular, scapulothoracic and UE control with self care, reaching, carrying, lifting, house/yardwork, driving/computer work  [] (42067) Reviewed/Progressed HEP activities related to improving balance, coordination, kinesthetic sense, posture, motor skill, proprioception of scapular, scapulothoracic and UE control with self care, reaching, carrying, lifting, house/yardwork, driving/computer work      Manual Treatments:  PROM / STM / Oscillations-Mobs:  G-I, II, III, IV (PA's, Inf., Post.)  [] ([id number]) Provided manual therapy to mobilize soft tissue/joints of cervical/CT, scapular GHJ and UE for the purpose of modulating pain, promoting relaxation,  increasing ROM, reducing/eliminating soft tissue swelling/inflammation/restriction, improving soft tissue extensibility and allowing for proper ROM for normal function with self care, reaching, carrying, lifting, house/yardwork, driving/computer work    ADL Training:  [] (20802) Provided self-care/home management training related to activities of daily living and compensatory training, and/or use of adaptive equipment      Charges:  Timed Code Treatment Minutes: 25   Total Treatment Minutes: 45   Worker's Comp: Time In/Time Out     [] EVAL (LOW) 88578 (typically 20 minutes face-to-face)    [] EVAL (MOD) 89556 (typically 30 minutes face-to-face)  [] EVAL (HIGH) 74042 (typically 45 minutes face-to-face)  [] OT Re-eval (61221)       [] Jenifer (83999) x      [] DNYUA(21830)  [] NMR (32516) x      [] Estim (attended) (19430)   [] Manual ([id number]) x      [] US (80697)  [] TA (11077) x      [] Paraffin (61077)  [] ADL  (69 [phone] 24 60) x     [] Splint/L code:    [] Estim (unattended) ([id number])  [] Fluidotherapy (62510)  [] Other:      ASSESSMENT:  See eval    GOALS: Patient stated goal: Get rid of the pain, decreased numbness    [] Progressing: [] Met: [] Not Met: [] Adjusted     Therapist goals for Patient:   Short Term Goals: To be achieved in: 2 weeks  1. Independent in HEP and progression per patient tolerance, in order to prevent re-injury. [] Progressing: [] Met: [] Not Met: [] Adjusted   2. Patient will have a decrease in pain to facilitate improvement in movement, function, and ADLs as indicated by Functional Deficits. [] Progressing: [] Met: [] Not Met: [] Adjusted     Long Term Goals to be achieved in 6 weeks (through 10/26/22), including patient directed goals to address patient identified performance deficits:  1) Pt to be independent in graded HEP progression with a good level of effort and compliance. [] Progressing: [] Met: [] Not Met: [] Adjusted   2) Pt to report a score of </= 80  on the FOTO questionnaire for increased performance with carrying, moving, and handling objects. [] Progressing: [] Met: [] Not Met: [] Adjusted   3) Pt will demonstrate increased strength to by 10# pain free for improved independence with heavier work and household tasks. .  [] Progressing: [] Met: [] Not Met: [] Adjusted   4) Pt will have a decrease in pain to 2/10 to facilitate return to normal functional use patterns during day. [] Progressing: [] Met: [] Not Met: [] Adjusted         Overall Progression Towards Functional Goals/Treatment Progress Update:  [] Patient is progressing as expected towards functional goals listed. [] Progression is slowed due to complexities/impairments listed. [] Progression has been slowed due to co-morbidities.   [x] Plan just implemented, too soon to assess goals progression <30 days  [] Goals require adjustment due to lack of progress  [] Patient is not progressing as expected and requires additional follow up with physician  [] All goals are met  [] Other:     Prognosis for POC: [x] Good [] Fair  [] Poor    Patient requires continued skilled intervention: [x] Yes  [] No    Treatment/Activity Tolerance:  [x] Patient able to complete treatment  [] Patient limited by fatigue  [] Patient limited by pain    [] Patient limited by other medical complications  [] Other:                  PLAN: See eval  [] Continue per plan of care [] Alter current plan (see comments above)  [x] Plan of care initiated [] Hold pending MD visit [] Discharge      Electronically signed by:  Corinne Dyke, OT OTR/L, [id number]      Note: If patient does not return for scheduled/ recommended follow up visits, this note will serve as a discharge from care along with most recent update on progress.

## 2022-09-19 ENCOUNTER — HOSPITAL ENCOUNTER (OUTPATIENT)
Dept: OCCUPATIONAL THERAPY | Age: 47
Setting detail: THERAPIES SERIES
Discharge: HOME | End: 2022-09-19
Payer: MEDICAID

## 2022-09-19 PROCEDURE — 97140 MANUAL THERAPY 1/> REGIONS: CPT | Performed by: OCCUPATIONAL THERAPIST

## 2022-09-19 PROCEDURE — 97110 THERAPEUTIC EXERCISES: CPT | Performed by: OCCUPATIONAL THERAPIST

## 2022-09-19 PROCEDURE — 97035 APP MDLTY 1+ULTRASOUND EA 15: CPT | Performed by: OCCUPATIONAL THERAPIST

## 2022-09-19 NOTE — FLOWSHEET NOTE
The [address] [place] and [address],  [facility], [facility] [address]  793 [facility],5[address] Floor   [facility], [address]  Phone: [phone]  Fax: [phone]       Occupational Therapy Treatment Note/ Progress Report:     Date:  2022    Patient Name:  Andres Heading    :  1975  MRN: [id number]    Medical/Treatment Diagnosis Information:  Diagnosis: R elbow pain, cubital tunnel   Treatment Diagnosis: X68.164     Insurance/Certification information:  OT Insurance Information: Tariq Carrasco  Physician Information:  EDI Abdalla   Has the plan of care been signed (Y/N):        []  Yes  [x]  No       Visit # Insurance Allowable Auth Required   2 30 []  Yes [x]  No        Is this a Progress Report:     []  Yes  [x]  No      If Yes:  Date Range for reporting period:  Beginning  Ending    Progress report will be due (10 Rx or 30 days whichever is less):      Recertification will be due (POC Duration  / 90 days whichever is less): 10/26/22     Date of Injury: 7 years of symptoms, worse recently  Date of Surgery: N/A    Date of Patient follow up with Physician: 22    RESTRICTIONS/PRECAUTIONS: activity to tolerance; avoid resting on elbow    Latex Allergy:  [x]No      []Yes  Pacemaker:  [x] No       [] Yes     Preferred Language for Healthcare:   [x]English       []other:     Comorbidities Affecting Functional Performance:     []Anxiety (F41.9)/Depression (F32.9)   []Diabetes Type 1(E10.65) or 2 (E11.65)   []Rheumatoid Arthritis (M05.9)  []Fibromyalgia (M79.7)  []Neuropathy(G60.9)  []Osteoarthritis(M19.91)  [x]None   []Other:    Functional Scale: 74 (FOTO)   Date assessed:  2022    SUBJECTIVE: Wearing purchased compression sleeve over elbow; helps more than CF brace. Trying to keep arm straight at night which helps; forgot about towel wrap strategy. Pain in elbow when dog pulls on leash.       7 years of symptoms, with recent worsening of related to improving balance, coordination, kinesthetic sense, posture, motor skill, proprioception and motor activation to allow for proper function of scapular, scapulothoracic and UE control with self care, carrying, lifting, driving/computer work    Home Exercise Program:    [] (37833) Reviewed/Progressed HEP activities related to strengthening, flexibility, endurance, ROM of scapular, scapulothoracic and UE control with self care, reaching, carrying, lifting, house/yardwork, driving/computer work  [] (44627) Reviewed/Progressed HEP activities related to improving balance, coordination, kinesthetic sense, posture, motor skill, proprioception of scapular, scapulothoracic and UE control with self care, reaching, carrying, lifting, house/yardwork, driving/computer work      Manual Treatments:  PROM / STM / Oscillations-Mobs:  G-I, II, III, IV (PA's, Inf., Post.)  [] (78095) Provided manual therapy to mobilize soft tissue/joints of cervical/CT, scapular GHJ and UE for the purpose of modulating pain, promoting relaxation,  increasing ROM, reducing/eliminating soft tissue swelling/inflammation/restriction, improving soft tissue extensibility and allowing for proper ROM for normal function with self care, reaching, carrying, lifting, house/yardwork, driving/computer work    ADL Training:  [] (03508) Provided self-care/home management training related to activities of daily living and compensatory training, and/or use of adaptive equipment      Charges:  Timed Code Treatment Minutes: 40   Total Treatment Minutes: 40   Worker's Comp: Time In/Time Out     [] EVAL (LOW) 96661 (typically 20 minutes face-to-face)    [] EVAL (MOD) 21459 (typically 30 minutes face-to-face)  [] EVAL (HIGH) 94824 (typically 45 minutes face-to-face)  [] OT Re-eval (96040)       [x] Jenifer [phone]) x 1     [] N[place]IB(55538)  [] NMR (96435) x      [] Estim (attended) (56190)   [x] Manual ([id number]) x 1     [x] US (47513)  [] TA (93266) x      [] Paraffin (65984)  [] ADL  (80 649 24 60) x     [] Splint/L code:    [] Estim (unattended) (22 [phone])  [] Fluidotherapy (08178)  [] Other:      ASSESSMENT:  See eval    GOALS: Patient stated goal: Get rid of the pain, decreased numbness    [] Progressing: [] Met: [] Not Met: [] Adjusted     Therapist goals for Patient:   Short Term Goals: To be achieved in: 2 weeks  1. Independent in HEP and progression per patient tolerance, in order to prevent re-injury. [] Progressing: [] Met: [] Not Met: [] Adjusted   2. Patient will have a decrease in pain to facilitate improvement in movement, function, and ADLs as indicated by Functional Deficits. [] Progressing: [] Met: [] Not Met: [] Adjusted     Long Term Goals to be achieved in 6 weeks (through 10/26/22), including patient directed goals to address patient identified performance deficits:  1) Pt to be independent in graded HEP progression with a good level of effort and compliance. [] Progressing: [] Met: [] Not Met: [] Adjusted   2) Pt to report a score of </= 80  on the FOTO questionnaire for increased performance with carrying, moving, and handling objects. [] Progressing: [] Met: [] Not Met: [] Adjusted   3) Pt will demonstrate increased strength to by 10# pain free for improved independence with heavier work and household tasks. .  [] Progressing: [] Met: [] Not Met: [] Adjusted   4) Pt will have a decrease in pain to 2/10 to facilitate return to normal functional use patterns during day. [] Progressing: [] Met: [] Not Met: [] Adjusted         Overall Progression Towards Functional Goals/Treatment Progress Update:  [] Patient is progressing as expected towards functional goals listed. [] Progression is slowed due to complexities/impairments listed. [] Progression has been slowed due to co-morbidities.   [x] Plan just implemented, too soon to assess goals progression <30 days  [] Goals require adjustment due to lack of progress  [] Patient is not progressing as expected and requires additional follow up with physician  [] All goals are met  [] Other:     Prognosis for POC: [x] Good [] Fair  [] Poor    Patient requires continued skilled intervention: [x] Yes  [] No    Treatment/Activity Tolerance:  [x] Patient able to complete treatment  [] Patient limited by fatigue  [] Patient limited by pain    [] Patient limited by other medical complications  [] Other:                  PLAN: See eval  [x] Continue per plan of care [] Alter current plan (see comments above)  [] Plan of care initiated [] Hold pending MD visit [] Discharge      Electronically signed by:  Tex Munoz OT OTR/L, CHT [id number]      Note: If patient does not return for scheduled/ recommended follow up visits, this note will serve as a discharge from care along with most recent update on progress.

## 2022-09-26 ENCOUNTER — OFFICE VISIT (OUTPATIENT)
Dept: ORTHOPEDIC SURGERY | Age: 47
End: 2022-09-26
Payer: MEDICAID

## 2022-09-26 ENCOUNTER — TELEPHONE (OUTPATIENT)
Dept: PHARMACY | Age: 47
End: 2022-09-26

## 2022-09-26 ENCOUNTER — HOSPITAL ENCOUNTER (OUTPATIENT)
Dept: OCCUPATIONAL THERAPY | Age: 47
Setting detail: THERAPIES SERIES
Discharge: HOME | End: 2022-09-26
Payer: MEDICAID

## 2022-09-26 VITALS — WEIGHT: 200 LBS | BODY MASS INDEX: 27.09 KG/M2 | HEIGHT: 72 IN

## 2022-09-26 DIAGNOSIS — M25.521 RIGHT ELBOW PAIN: Primary | ICD-10-CM

## 2022-09-26 PROCEDURE — 97110 THERAPEUTIC EXERCISES: CPT | Performed by: OCCUPATIONAL THERAPIST

## 2022-09-26 PROCEDURE — 97035 APP MDLTY 1+ULTRASOUND EA 15: CPT | Performed by: OCCUPATIONAL THERAPIST

## 2022-09-26 PROCEDURE — G8419 CALC BMI OUT NRM PARAM NOF/U: HCPCS | Performed by: PHYSICIAN ASSISTANT

## 2022-09-26 PROCEDURE — G8427 DOCREV CUR MEDS BY ELIG CLIN: HCPCS | Performed by: PHYSICIAN ASSISTANT

## 2022-09-26 PROCEDURE — 4004F PT TOBACCO SCREEN RCVD TLK: CPT | Performed by: PHYSICIAN ASSISTANT

## 2022-09-26 PROCEDURE — 97140 MANUAL THERAPY 1/> REGIONS: CPT | Performed by: OCCUPATIONAL THERAPIST

## 2022-09-26 PROCEDURE — 99214 OFFICE O/P EST MOD 30 MIN: CPT | Performed by: PHYSICIAN ASSISTANT

## 2022-09-26 RX ORDER — DICLOFENAC SODIUM 75 MG/1
75 TABLET, DELAYED RELEASE ORAL 2 TIMES DAILY
Qty: 60 TABLET | Refills: 3 | Status: SHIPPED | OUTPATIENT
Start: 2022-09-26

## 2022-09-26 NOTE — TELEPHONE ENCOUNTER
Per Ms Ashlie Cannon:  Can you find out if patient would still like to be seen by pharmacist for smoking cessation treatment? Left message on his voicemail.   Sent him a [employer] message (although last time [employer] was checked was January)

## 2022-09-26 NOTE — TELEPHONE ENCOUNTER
Rodolfo Gallardo,     We have not been able to contact patient to schedule follow-up appointments in the smoking cessation clinic. We will discontinue attempts to schedule at this time. If he is interested in re-enrolling in the future please have him reach out to us, or place a new consult. Thank you!    Heena Don, PharmD, [facility]  [address] [facility]  [place]: [phone]  [place]: [phone]  9/26/2022 11:42 AM

## 2022-09-26 NOTE — FLOWSHEET NOTE
The [address] [facility] and [address],  Sports Performance and Rehabilitation, [facility] [address]  793 [facility],5Th Floor   [place], [address]  Phone: [phone]  Fax: [phone]       Occupational Therapy Treatment Note/ Progress Report:     Date:  2022    Patient Name:  Dorita Lenz    :  1975  MRN: [id number]    Medical/Treatment Diagnosis Information:  Diagnosis: R elbow pain, cubital tunnel   Treatment Diagnosis: G15.849     Insurance/Certification information:  OT Insurance Information: Tunde Law  Physician Information:  EDI Nails   Has the plan of care been signed (Y/N):        []  Yes  [x]  No       Visit # Insurance Allowable Auth Required   3 30 []  Yes [x]  No        Is this a Progress Report:     []  Yes  [x]  No      If Yes:  Date Range for reporting period:  Beginning  Ending    Progress report will be due (10 Rx or 30 days whichever is less):      Recertification will be due (POC Duration  / 90 days whichever is less): 10/26/22     Date of Injury: 7 years of symptoms, worse recently  Date of Surgery: N/A    Date of Patient follow up with Physician: 22    RESTRICTIONS/PRECAUTIONS: activity to tolerance; avoid resting on elbow    Latex Allergy:  [x]No      []Yes  Pacemaker:  [x] No       [] Yes     Preferred Language for Healthcare:   [x]English       []other:     Comorbidities Affecting Functional Performance:     []Anxiety (F41.9)/Depression (F32.9)   []Diabetes Type 1(E10.65) or 2 (E11.65)   []Rheumatoid Arthritis (M05.9)  []Fibromyalgia (M79.7)  []Neuropathy(G60.9)  []Osteoarthritis(M19.91)  [x]None   []Other:    Functional Scale: 74 (FOTO)   Date assessed:  2022    SUBJECTIVE: To clinic following PA appt; \"she was hoping for more progress at this point\". Given referral to surgeon if necessary, but continue therapy 1x/week. Taken off Meloxicam, new script for Voltaren. Tried towel wrap at night but was awkward.       7 years of symptoms, with recent worsening of numbness; has taken Meloxicam with some relief, but no long term effect. Elbow is more flared up after working. Sleeping with arm straight helps with pain and numbness. Pain Scale: 2/10, posterior elbow    OBJECTIVE:       Date: 9/12/22 9/19/22 9/26/22     Objective Measures/Tests:            ROM: See eval                       Strength: NT R100 L140  Pain free          Observations: Other:                  MODALITIES:      Fluidotherapy (33833)      Estim (91100/27535)      Paraffin (00858)      US (20956)  8' posterior/lateral elbow, 50%, 1.4w    Iontophoresis (53582)      Hot Pack      Cold Pack            INTERVENTIONS:      Therapeutic Exercise (39746)      UBE  5'                      Therapeutic Activity (61928)                              Manual Therapy (26657)  Radial musc STM  Forearm stretches                Neuromuscular Reeducation (57057)                  ADL Training (27037)                  HEP Training/Review Forearm stretches, elbow straight Review     Towel wrap at night as flexion block D/C towel wrap     Avoid resting on elbow and forearm     Splinting      Lcode:      Orthotic Mgmt, Subsequent Enc (45583)      Orthotic Mgmt & Training (96382)            Other: Pt shown proper CF brace positioning                               Therapeutic Exercise & NMR:  [] (59348) Provided verbal/tactile cueing for activities related to strengthening, flexibility, endurance, ROM  for improvements in scapular, scapulothoracic and UE control with self care, reaching, carrying, lifting, house/yardwork, driving/computer work.    [] (49793) Provided verbal/tactile cueing for activities related to improving balance, coordination, kinesthetic sense, posture, motor skill, proprioception  to assist with  scapular, scapulothoracic and UE control with self care, reaching, carrying, lifting, house/yardwork, driving/computer work.     Therapeutic Activities & NMR: [] (05052 or 54806) Provided verbal/tactile cueing for activities related to improving balance, coordination, kinesthetic sense, posture, motor skill, proprioception and motor activation to allow for proper function of scapular, scapulothoracic and UE control with self care, carrying, lifting, driving/computer work    Home Exercise Program:    [] (32725) Reviewed/Progressed HEP activities related to strengthening, flexibility, endurance, ROM of scapular, scapulothoracic and UE control with self care, reaching, carrying, lifting, house/yardwork, driving/computer work  [] (48743) Reviewed/Progressed HEP activities related to improving balance, coordination, kinesthetic sense, posture, motor skill, proprioception of scapular, scapulothoracic and UE control with self care, reaching, carrying, lifting, house/yardwork, driving/computer work      Manual Treatments:  PROM / STM / Oscillations-Mobs:  G-I, II, III, IV (PA's, Inf., Post.)  [] (20364) Provided manual therapy to mobilize soft tissue/joints of cervical/CT, scapular GHJ and UE for the purpose of modulating pain, promoting relaxation,  increasing ROM, reducing/eliminating soft tissue swelling/inflammation/restriction, improving soft tissue extensibility and allowing for proper ROM for normal function with self care, reaching, carrying, lifting, house/yardwork, driving/computer work    ADL Training:  [] (52215) Provided self-care/home management training related to activities of daily living and compensatory training, and/or use of adaptive equipment      Charges:  Timed Code Treatment Minutes: 40   Total Treatment Minutes: 40   Worker's Comp: Time In/Time Out     [] EVAL (LOW) 38544 (typically 20 minutes face-to-face)    [] EVAL (MOD) 91645 (typically 30 minutes face-to-face)  [] EVAL (HIGH) 98497 (typically 45 minutes face-to-face)  [] OT Re-eval (11077)       [x] Jenifer [phone] x 1     [] IJYRW(90176)  [] NMR (04699) x      [] Estim (attended) (59088)   [x] Manual ([phone]) x 1     [x] US (01372)  [] TA (54543) x      [] Paraffin (65216)  [] ADL  (27306) x     [] Splint/L code:    [] Estim (unattended) ([phone])  [] Fluidotherapy (31766)  [] Other:      ASSESSMENT:  See eval    GOALS: Patient stated goal: Get rid of the pain, decreased numbness    [] Progressing: [] Met: [] Not Met: [] Adjusted     Therapist goals for Patient:   Short Term Goals: To be achieved in: 2 weeks  1. Independent in HEP and progression per patient tolerance, in order to prevent re-injury. [] Progressing: [] Met: [] Not Met: [] Adjusted   2. Patient will have a decrease in pain to facilitate improvement in movement, function, and ADLs as indicated by Functional Deficits. [] Progressing: [] Met: [] Not Met: [] Adjusted     Long Term Goals to be achieved in 6 weeks (through 10/26/22), including patient directed goals to address patient identified performance deficits:  1) Pt to be independent in graded HEP progression with a good level of effort and compliance. [] Progressing: [] Met: [] Not Met: [] Adjusted   2) Pt to report a score of </= 80  on the FOTO questionnaire for increased performance with carrying, moving, and handling objects. [] Progressing: [] Met: [] Not Met: [] Adjusted   3) Pt will demonstrate increased strength to by 10# pain free for improved independence with heavier work and household tasks. .  [] Progressing: [] Met: [] Not Met: [] Adjusted   4) Pt will have a decrease in pain to 2/10 to facilitate return to normal functional use patterns during day. [] Progressing: [] Met: [] Not Met: [] Adjusted         Overall Progression Towards Functional Goals/Treatment Progress Update:  [] Patient is progressing as expected towards functional goals listed. [] Progression is slowed due to complexities/impairments listed. [] Progression has been slowed due to co-morbidities.   [x] Plan just implemented, too soon to assess goals progression <30 days  [] Goals require adjustment due to lack of progress  [] Patient is not progressing as expected and requires additional follow up with physician  [] All goals are met  [] Other:     Prognosis for POC: [x] Good [] Fair  [] Poor    Patient requires continued skilled intervention: [x] Yes  [] No    Treatment/Activity Tolerance:  [x] Patient able to complete treatment  [] Patient limited by fatigue  [] Patient limited by pain    [] Patient limited by other medical complications  [] Other:                  PLAN: See eval  [x] Continue per plan of care [] Alter current plan (see comments above)  [] Plan of care initiated [] Hold pending MD visit [] Discharge      Electronically signed by:  Lydia Og OT OTR/L, CHT [id number]      Note: If patient does not return for scheduled/ recommended follow up visits, this note will serve as a discharge from care along with most recent update on progress.

## 2022-09-26 NOTE — PROGRESS NOTES
Follow-up (Right elbow)      History of Present Illness:  Noam Rickard Fabry is a 56 y.o. male here for follow up regarding her right elbow. As a review,   Patient states that he has had several years of right elbow pain, approximated at 5 to 7 years. 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 He is here today because over the past 6 weeks he has noticed an increase in right elbow pain. Patient also endorses numbness and tingling over his fingers that is positional.  If he rests his elbow on exam desk for extended period of time exacerbates his numbness and tingling. Occasionally it is in his first 3 fingers and occasionally its in his last 2 fingers. Patient has been in physical therapy and has been using anti-inflammatories with minimal relief. Medical History:  Patient's medications, allergies, past medical, surgical, social and family histories were reviewed and updated as appropriate. Pain Assessment  Location of Pain: Elbow  Location Modifiers: Right  Severity of Pain: 3  Quality of Pain: Dull  Duration of Pain: Persistent  Frequency of Pain: Constant  Aggravating Factors: Exercise  Limiting Behavior: Some  Relieving Factors: Rest  Result of Injury: Yes  Work-Related Injury: No  Are there other pain locations you wish to document?: No  ROS: Review of systems reviewed from Patient History Form completed today and available in the patient's chart under the Media tab. Pertinent items are noted in HPI  Review of systems reviewed from Patient History Form completed today and available in the patient's chart under the Media tab. Vital Signs: There were no vitals filed for this visit. Right elbow examination:     Inspection:    Normal alignment. No swelling. Palpation:     Medial and lateral epicondyle tenderness palpation     Range of Motion:      0-135 degrees.        Strength:       5/5 in all muscle groups     Instability testing:  Stable to varus and valgus stress     Vascular exam:    Skin warm and well-perfused. Neurologic exam:     Sensation intact to light positive Tinel sign both medially and laterally     Left elbow comparison examination:     Inspection:    Normal alignment. No swelling. Palpation:     No tenderness to palpation     Range of Motion:      0-135 degrees. Strength:       5/5 in all muscle groups     Instability testing:  Stable to varus and valgus stress     Vascular exam:    Skin warm and well-perfused. Neurologic exam:     Sensation intact to light       Radiology:       Pertinent imaging was interpreted and reviewed today, images only - no report available. No new imaging was obtained during today's visit. ]           Assessment :  56 y.o. male with right elbow cubital tunnel syndrome    Impression:  Encounter Diagnosis   Name Primary? Right elbow pain Yes       Office Procedures:  No orders of the defined types were placed in this encounter. Plan: Pertinent imaging was reviewed. The etiology, natural history, and treatment options for the disorder were discussed. The roles of activity medication, antiinflammatories, injections, bracing, physical therapy, and surgical interventions were all described to the patient and questions were answered. Patient has persistent numbness and tingling in his fingers and a markedly positive Tinel's sign over his cubital tunnel. He also has radial symptoms as well. Physical therapy appears to have improved slightly however he still has persistent medial and lateral elbow pain with radiating symptoms numbness and tingling. At this time I believe he is a candidate for referral to see Dr. Milli De Jesus. Tiesha Bliss is in agreement with this plan. All questions were answered to patient's satisfaction and was encouraged to call with any further questions.      Total time spent for evaluation, education, and development of treatment plan: 35 minutes    Nam Barclay, [address]  9/26/2022      This dictation was performed with a verbal recognition program [facility]) and it was checked for errors. It is possible that there are still dictated errors within this office note. If so, please bring any areas to my attention for an addendum. All efforts were made to ensure that this office note is accurate.

## 2022-09-27 ENCOUNTER — TELEPHONE (OUTPATIENT)
Dept: PRIMARY CARE CLINIC | Age: 47
End: 2022-09-27

## 2022-09-27 NOTE — TELEPHONE ENCOUNTER
Pt calling back saying someone called him yesterday. He erased the message.   He thinks it may be about refills but thinks the office may not know about some med changes

## 2022-09-27 NOTE — TELEPHONE ENCOUNTER
Called patient and asked if he was interested in smoking cessation treatment. Patient stated that he was not interested at this time for treatment. Routing to [employer] to notify.

## 2022-10-03 ENCOUNTER — HOSPITAL ENCOUNTER (OUTPATIENT)
Dept: OCCUPATIONAL THERAPY | Age: 47
Setting detail: THERAPIES SERIES
Discharge: HOME | End: 2022-10-03
Payer: MEDICAID

## 2022-10-03 PROCEDURE — 97140 MANUAL THERAPY 1/> REGIONS: CPT | Performed by: OCCUPATIONAL THERAPIST

## 2022-10-03 PROCEDURE — 97110 THERAPEUTIC EXERCISES: CPT | Performed by: OCCUPATIONAL THERAPIST

## 2022-10-03 NOTE — FLOWSHEET NOTE
The [facility] and [address],  Sports Performance and Rehabilitation, [facility] [address] [place] [address]  793 [facility],5Th Floor   [place]  Phone: [phone]  Fax: [phone]       Occupational Therapy Treatment Note/ Progress Report:     Date:  10/3/2022    Patient Name:  Jet Juarez    :  1975  MRN: [id number]    Medical/Treatment Diagnosis Information:  Diagnosis: R elbow pain, cubital tunnel   Treatment Diagnosis: D16.022     Insurance/Certification information:  OT Insurance Information: Saji Galvan  Physician Information:  EDI Lucas   Has the plan of care been signed (Y/N):        []  Yes  [x]  No       Visit # Insurance Allowable Auth Required   4 30 []  Yes [x]  No        Is this a Progress Report:     []  Yes  [x]  No      If Yes:  Date Range for reporting period:  Beginning  Ending    Progress report will be due (10 Rx or 30 days whichever is less):      Recertification will be due (POC Duration  / 90 days whichever is less): 10/26/22     Date of Injury: 7 years of symptoms, worse recently  Date of Surgery: N/A    Date of Patient follow up with Physician: 22    RESTRICTIONS/PRECAUTIONS: activity to tolerance; avoid resting on elbow    Latex Allergy:  [x]No      []Yes  Pacemaker:  [x] No       [] Yes     Preferred Language for Healthcare:   [x]English       []other:     Comorbidities Affecting Functional Performance:     []Anxiety (F41.9)/Depression (F32.9)   []Diabetes Type 1(E10.65) or 2 (E11.65)   []Rheumatoid Arthritis (M05.9)  []Fibromyalgia (M79.7)  []Neuropathy(G60.9)  []Osteoarthritis(M19.91)  [x]None   []Other:    Functional Scale: 76 (FOTO)   Date assessed:  2022    SUBJECTIVE: Reports some confusion with new medication at pharmacy; Voltaren filled this morning. Elbow still helped by HEP, keeping arm straight at night.  Did some high impact work over the weekend; some increased numbness in hand this morning      7 years of symptoms, with recent worsening of numbness; has taken Meloxicam with some relief, but no long term effect. Elbow is more flared up after working. Sleeping with arm straight helps with pain and numbness. Pain Scale: 2/10, posterior elbow    OBJECTIVE:       Date: 9/12/22   9/19/22   9/26/22   10/3/22     Objective Measures/Tests:              ROM: See eval                           Strength: NT R100 L140  Pain free  R113  Pain free          Observations:         Other:                     MODALITIES:       Fluidotherapy (18438)       Estim (95034/93173)       Paraffin (95705)       US (38764)  8' posterior/lateral elbow, 50%, 1.4w     Iontophoresis (19452)       Hot Pack    8' elbow   Cold Pack              INTERVENTIONS:       Therapeutic Exercise (12175)       UBE  5'  8' increase elbow pain                        Therapeutic Activity (29438)                                   Manual Therapy (60924)  Radial musc STM  Forearm stretches  Radial musc STM  Forearm stretches                 Neuromuscular Reeducation (94283)                     ADL Training (04211)                     HEP Training/Review Forearm stretches, elbow straight Review  Chin tuck  Doorway ant shoulder stretch    Towel wrap at night as flexion block D/C towel wrap  Nerve glides using corner w/digits extension    Avoid resting on elbow and forearm      Splinting       Lcode:       Orthotic Mgmt, Subsequent Enc (30587)       Orthotic Mgmt & Training (48392)              Other: Pt shown proper CF brace positioning                                    Therapeutic Exercise & NMR:  [] (98022) Provided verbal/tactile cueing for activities related to strengthening, flexibility, endurance, ROM  for improvements in scapular, scapulothoracic and UE control with self care, reaching, carrying, lifting, house/yardwork, driving/computer work.    [] (31010) Provided verbal/tactile cueing for activities related to improving balance, coordination, kinesthetic sense, posture, motor skill, proprioception  to assist with  scapular, scapulothoracic and UE control with self care, reaching, carrying, lifting, house/yardwork, driving/computer work.     Therapeutic Activities & NMR:    [] (08379 or 53304) Provided verbal/tactile cueing for activities related to improving balance, coordination, kinesthetic sense, posture, motor skill, proprioception and motor activation to allow for proper function of scapular, scapulothoracic and UE control with self care, carrying, lifting, driving/computer work    Home Exercise Program:    [] (15953) Reviewed/Progressed HEP activities related to strengthening, flexibility, endurance, ROM of scapular, scapulothoracic and UE control with self care, reaching, carrying, lifting, house/yardwork, driving/computer work  [] (39188) Reviewed/Progressed HEP activities related to improving balance, coordination, kinesthetic sense, posture, motor skill, proprioception of scapular, scapulothoracic and UE control with self care, reaching, carrying, lifting, house/yardwork, driving/computer work      Manual Treatments:  PROM / STM / Oscillations-Mobs:  G-I, II, III, IV (PA's, Inf., Post.)  [] (49266) Provided manual therapy to mobilize soft tissue/joints of cervical/CT, scapular GHJ and UE for the purpose of modulating pain, promoting relaxation,  increasing ROM, reducing/eliminating soft tissue swelling/inflammation/restriction, improving soft tissue extensibility and allowing for proper ROM for normal function with self care, reaching, carrying, lifting, house/yardwork, driving/computer work    ADL Training:  [] (17781) Provided self-care/home management training related to activities of daily living and compensatory training, and/or use of adaptive equipment      Charges:  Timed Code Treatment Minutes: 42   Total Treatment Minutes: 50   Worker's Comp: Time In/Time Out     [] EVAL (LOW) 86889 (typically 20 minutes face-to-face)    [] EVAL (MOD) 65985 (typically 30 minutes face-to-face)  [] EVAL (HIGH) 49572 (typically 45 minutes face-to-face)  [] OT Re-eval (52706)       [x] Jenifer [phone] x 2     [] RIVQV(53876)  [] NMR (17689) x      [] Estim (attended) (68972)   [x] Manual ([phone]) x 1     [] US (41734)  [] TA (20694) x      [] Paraffin (97723)  [] ADL  (26967) x     [] Splint/L code:    [] Estim (unattended) [phone])  [] Fluidotherapy (04092)  [] Other:      ASSESSMENT: Ulnar hand/small finger shooting pain with doorway stretch, resolved with angling of hand at 45° during stretch. Likely high nerve involvement    GOALS: Patient stated goal: Get rid of the pain, decreased numbness    [] Progressing: [] Met: [] Not Met: [] Adjusted     Therapist goals for Patient:   Short Term Goals: To be achieved in: 2 weeks  1. Independent in HEP and progression per patient tolerance, in order to prevent re-injury. [] Progressing: [] Met: [] Not Met: [] Adjusted   2. Patient will have a decrease in pain to facilitate improvement in movement, function, and ADLs as indicated by Functional Deficits. [] Progressing: [] Met: [] Not Met: [] Adjusted     Long Term Goals to be achieved in 6 weeks (through 10/26/22), including patient directed goals to address patient identified performance deficits:  1) Pt to be independent in graded HEP progression with a good level of effort and compliance. [] Progressing: [] Met: [] Not Met: [] Adjusted   2) Pt to report a score of </= 80  on the FOTO questionnaire for increased performance with carrying, moving, and handling objects. [] Progressing: [] Met: [] Not Met: [] Adjusted   3) Pt will demonstrate increased strength to by 10# pain free for improved independence with heavier work and household tasks. .  [] Progressing: [] Met: [] Not Met: [] Adjusted   4) Pt will have a decrease in pain to 2/10 to facilitate return to normal functional use patterns during day.   [] Progressing: [] Met: [] Not Met: [] Adjusted         Overall Progression Towards Functional Goals/Treatment Progress Update:  [] Patient is progressing as expected towards functional goals listed. [] Progression is slowed due to complexities/impairments listed. [x] Progression has been slowed due to co-morbidities. [] Plan just implemented, too soon to assess goals progression <30 days  [] Goals require adjustment due to lack of progress  [] Patient is not progressing as expected and requires additional follow up with physician  [] All goals are met  [] Other:     Prognosis for POC: [x] Good [] Fair  [] Poor    Patient requires continued skilled intervention: [x] Yes  [] No    Treatment/Activity Tolerance:  [x] Patient able to complete treatment  [] Patient limited by fatigue  [] Patient limited by pain    [] Patient limited by other medical complications  [] Other:                  PLAN: To see next week to gauge HEP effectiveness  [x] Continue per plan of care [] Alter current plan (see comments above)  [] Plan of care initiated [] Hold pending MD visit [] Discharge      Electronically signed by:  Lisseth Riddle OT OTR/L, CHT [id number]      Note: If patient does not return for scheduled/ recommended follow up visits, this note will serve as a discharge from care along with most recent update on progress.

## 2022-10-10 ENCOUNTER — HOSPITAL ENCOUNTER (OUTPATIENT)
Dept: OCCUPATIONAL THERAPY | Age: 47
Setting detail: THERAPIES SERIES
Discharge: HOME | End: 2022-10-10
Payer: MEDICAID

## 2022-10-17 ENCOUNTER — HOSPITAL ENCOUNTER (OUTPATIENT)
Dept: OCCUPATIONAL THERAPY | Age: 47
Setting detail: THERAPIES SERIES
Discharge: HOME | End: 2022-10-17
Payer: MEDICAID

## 2022-10-17 PROCEDURE — 97035 APP MDLTY 1+ULTRASOUND EA 15: CPT | Performed by: OCCUPATIONAL THERAPIST

## 2022-10-17 PROCEDURE — 97110 THERAPEUTIC EXERCISES: CPT | Performed by: OCCUPATIONAL THERAPIST

## 2022-10-17 PROCEDURE — 97140 MANUAL THERAPY 1/> REGIONS: CPT | Performed by: OCCUPATIONAL THERAPIST

## 2022-10-17 NOTE — FLOWSHEET NOTE
The [facility] and [address],  Sports Performance and Rehabilitation, [facility] [address] [place] [address]  [address] [facility],[address]   Luis Rice  Phone: [phone]  Fax: [phone]       Occupational Therapy Treatment Note/ Progress Report:     Date:  10/17/2022    Patient Name:  Francheska Garcia    :  1975  MRN: [id number]    Medical/Treatment Diagnosis Information:  Diagnosis: R elbow pain, cubital tunnel   Treatment Diagnosis: E98.025     Insurance/Certification information:  OT Insurance Information: Lan Ast  Physician Information:  EDI Charles   Has the plan of care been signed (Y/N):        []  Yes  [x]  No       Visit # Insurance Allowable Auth Required   5 30 []  Yes [x]  No        Is this a Progress Report:     []  Yes  [x]  No      If Yes:  Date Range for reporting period:  Beginning  Ending    Progress report will be due (10 Rx or 30 days whichever is less):      Recertification will be due (POC Duration  / 90 days whichever is less): 10/26/22     Date of Injury: 7 years of symptoms, worse recently  Date of Surgery: N/A    Date of Patient follow up with Physician: 22    RESTRICTIONS/PRECAUTIONS: activity to tolerance; avoid resting on elbow    Latex Allergy:  [x]No      []Yes  Pacemaker:  [x] No       [] Yes     Preferred Language for Healthcare:   [x]English       []other:     Comorbidities Affecting Functional Performance:     []Anxiety (F41.9)/Depression (F32.9)   []Diabetes Type 1(E10.65) or 2 (E11.65)   []Rheumatoid Arthritis (M05.9)  []Fibromyalgia (M79.7)  []Neuropathy(G60.9)  []Osteoarthritis(M19.91)  [x]None   []Other:    Functional Scale: 74 (FOTO)   Date assessed:  2022    SUBJECTIVE: New Voltaren regimen has made a difference, as has nerve glide exercise. Reports less pain day to day, and less intensity with flare up. Did not work this weekend.       7 years of symptoms, with recent worsening of numbness; has taken Meloxicam with some relief, but no long term effect. Elbow is more flared up after working. Sleeping with arm straight helps with pain and numbness. Pain Scale: 0/10     OBJECTIVE:       Date: 9/12/22   9/19/22   9/26/22   10/3/22   10/17/22     Objective Measures/Tests:                ROM: See eval                               Strength: NT R100 L140  Pain free  R113  Pain free R118  Pain free           Observations:          Other:                        MODALITIES:        Fluidotherapy (42712)        Estim (46439/19948)        Paraffin (76798)        US (24119)  8' posterior/lateral elbow, 50%, 1.4w   8' lat elbow, 50%, 1.0w   Iontophoresis (18301)        Hot Pack    8' elbow    Cold Pack                INTERVENTIONS:        Therapeutic Exercise (84678)        UBE  5'  8' increase elbow pain                            Therapeutic Activity (62132)                                        Manual Therapy (08729)  Radial musc STM  Forearm stretches  Radial musc STM  Forearm stretches Radial musc STM, nerve glides, forearm stretches                   Neuromuscular Reeducation (59344)                        ADL Training (54491)                        HEP Training/Review Forearm stretches, elbow straight Review  Chin tuck  Doorway ant shoulder stretch Review of HEP; discussed further care from surgeon as needed    Towel wrap at night as flexion block D/C towel wrap  Nerve glides using corner w/digits extension     Avoid resting on elbow and forearm       Splinting        Lcode:        Orthotic Mgmt, Subsequent Enc (24129)        Orthotic Mgmt & Training (37795)                Other: Pt shown proper CF brace positioning                                         Therapeutic Exercise & NMR:  [] (38876) Provided verbal/tactile cueing for activities related to strengthening, flexibility, endurance, ROM  for improvements in scapular, scapulothoracic and UE control with self care, reaching, carrying, lifting, house/yardwork, driving/computer work.    [] (16103) Provided verbal/tactile cueing for activities related to improving balance, coordination, kinesthetic sense, posture, motor skill, proprioception  to assist with  scapular, scapulothoracic and UE control with self care, reaching, carrying, lifting, house/yardwork, driving/computer work.     Therapeutic Activities & NMR:    [] (33910 or 21331) Provided verbal/tactile cueing for activities related to improving balance, coordination, kinesthetic sense, posture, motor skill, proprioception and motor activation to allow for proper function of scapular, scapulothoracic and UE control with self care, carrying, lifting, driving/computer work    Home Exercise Program:    [] (66220) Reviewed/Progressed HEP activities related to strengthening, flexibility, endurance, ROM of scapular, scapulothoracic and UE control with self care, reaching, carrying, lifting, house/yardwork, driving/computer work  [] (36530) Reviewed/Progressed HEP activities related to improving balance, coordination, kinesthetic sense, posture, motor skill, proprioception of scapular, scapulothoracic and UE control with self care, reaching, carrying, lifting, house/yardwork, driving/computer work      Manual Treatments:  PROM / STM / Oscillations-Mobs:  G-I, II, III, IV (PA's, Inf., Post.)  [] (30744) Provided manual therapy to mobilize soft tissue/joints of cervical/CT, scapular GHJ and UE for the purpose of modulating pain, promoting relaxation,  increasing ROM, reducing/eliminating soft tissue swelling/inflammation/restriction, improving soft tissue extensibility and allowing for proper ROM for normal function with self care, reaching, carrying, lifting, house/yardwork, driving/computer work    ADL Training:  [] (28876) Provided self-care/home management training related to activities of daily living and compensatory training, and/or use of adaptive equipment      Charges:  Timed Code Treatment Minutes: 40   Total Treatment Minutes: 40   Worker's Comp: Time In/Time Out     [] EVAL (LOW) 63072 (typically 20 minutes face-to-face)    [] EVAL (MOD) 01416 (typically 30 minutes face-to-face)  [] EVAL (HIGH) 56256 (typically 45 minutes face-to-face)  [] OT Re-eval (86951)       [x] Jenifer (56187) x 1     [] DCSDG(94636)  [] NMR (84402) x      [] Estim (attended) (37514)   [x] Manual ([phone]) x 1     [x] US (33694)  [] TA (15789) x      [] Paraffin (58426)  [] ADL  (80580) x     [] Splint/L code:    [] Estim (unattended) ([id number] [phone])  [] Fluidotherapy (13593)  [] Other:      ASSESSMENT: Showing progress with new medication and new HEP    GOALS: Patient stated goal: Get rid of the pain, decreased numbness    [] Progressing: [] Met: [] Not Met: [] Adjusted     Therapist goals for Patient:   Short Term Goals: To be achieved in: 2 weeks  1. Independent in HEP and progression per patient tolerance, in order to prevent re-injury. [x] Progressing: [] Met: [] Not Met: [] Adjusted   2. Patient will have a decrease in pain to facilitate improvement in movement, function, and ADLs as indicated by Functional Deficits. [x] Progressing: [] Met: [] Not Met: [] Adjusted     Long Term Goals to be achieved in 6 weeks (through 10/26/22), including patient directed goals to address patient identified performance deficits:  1) Pt to be independent in graded HEP progression with a good level of effort and compliance. [x] Progressing: [] Met: [] Not Met: [] Adjusted   2) Pt to report a score of </= 80  on the FOTO questionnaire for increased performance with carrying, moving, and handling objects. [] Progressing: [] Met: [x] Not Met: [] Adjusted   3) Pt will demonstrate increased strength to by 10# pain free for improved independence with heavier work and household tasks. .  [] Progressing: [x] Met: [] Not Met: [] Adjusted   4) Pt will have a decrease in pain to 2/10 to facilitate return to normal functional use patterns during day.   [] Progressing: [x] Met: [] Not Met: [] Adjusted         Overall Progression Towards Functional Goals/Treatment Progress Update:  [x] Patient is progressing as expected towards functional goals listed. [] Progression is slowed due to complexities/impairments listed. [] Progression has been slowed due to co-morbidities. [] Plan just implemented, too soon to assess goals progression <30 days  [] Goals require adjustment due to lack of progress  [] Patient is not progressing as expected and requires additional follow up with physician  [] All goals are met  [] Other:     Prognosis for POC: [x] Good [] Fair  [] Poor    Patient requires continued skilled intervention: [x] Yes  [] No    Treatment/Activity Tolerance:  [x] Patient able to complete treatment  [] Patient limited by fatigue  [] Patient limited by pain    [] Patient limited by other medical complications  [] Other:                  PLAN: To see in 2 weeks; gauge need for further in clinic therapy  [x] Continue per plan of care [] Alter current plan (see comments above)  [] Plan of care initiated [] Hold pending MD visit [] Discharge      Electronically signed by:  Cassy Wright OT OTR/L, T [id number]      Note: If patient does not return for scheduled/ recommended follow up visits, this note will serve as a discharge from care along with most recent update on progress.

## 2022-10-31 ENCOUNTER — HOSPITAL ENCOUNTER (OUTPATIENT)
Dept: OCCUPATIONAL THERAPY | Age: 47
Setting detail: THERAPIES SERIES
Discharge: HOME | End: 2022-10-31
Payer: MEDICAID

## 2022-10-31 PROCEDURE — 97110 THERAPEUTIC EXERCISES: CPT | Performed by: OCCUPATIONAL THERAPIST

## 2022-10-31 NOTE — FLOWSHEET NOTE
[facility] and [address],  Sports Performance and Rehabilitation, [facility] [address]  [address] [facility],5Th Floor   Luis Rice  Phone: [phone]  Fax: [phone]       Occupational Therapy Treatment Note/ Progress Report:     Date:  10/31/2022    Patient Name:  Ina Osorio    :  1975  MRN: [id number]    Medical/Treatment Diagnosis Information:  Diagnosis: R elbow pain, cubital tunnel   Treatment Diagnosis: M61.435     Insurance/Certification information:  OT Insurance Information: Hattie Fernandez  Physician Information:  EDI Frias   Has the plan of care been signed (Y/N):        []  Yes  [x]  No       Visit # Insurance Allowable Auth Required    30 []  Yes [x]  No        Is this a Progress Report:     []  Yes  [x]  No      If Yes:  Date Range for reporting period:  Beginning  Ending    Progress report will be due (10 Rx or 30 days whichever is less):      Recertification will be due (POC Duration  / 90 days whichever is less): 10/26/22     Date of Injury: 7 years of symptoms, worse recently  Date of Surgery: N/A    Date of Patient follow up with Physician: as needed    RESTRICTIONS/PRECAUTIONS: activity to tolerance; avoid resting on elbow    Latex Allergy:  [x]No      []Yes  Pacemaker:  [x] No       [] Yes     Preferred Language for Healthcare:   [x]English       []other:     Comorbidities Affecting Functional Performance:     []Anxiety (F41.9)/Depression (F32.9)   []Diabetes Type 1(E10.65) or 2 (E11.65)   []Rheumatoid Arthritis (M05.9)  []Fibromyalgia (M79.7)  []Neuropathy(G60.9)  []Osteoarthritis(M19.91)  [x]None   []Other:    Functional Scale: 73  (FOTO)   Date assessed:  10/31/22    SUBJECTIVE: New Voltaren regimen has made a difference, as has nerve glide exercise. Reports less pain day to day, and less intensity with flare up. Did not work this weekend.       7 years of symptoms, with recent worsening of numbness; has taken Meloxicam with some relief, but no long term effect. Elbow is more flared up after working. Sleeping with arm straight helps with pain and numbness. Pain Scale: 0/10     OBJECTIVE:       Date: 9/12/22   9/19/22   9/26/22   10/3/22   10/17/22   10/31/22     Objective Measures/Tests:                  ROM: See eval                                   Strength: NT R100 L140  Pain free  R113  Pain free R118  Pain free R118 Pain free            Observations:           Other:                           MODALITIES:         Fluidotherapy (98670)         Estim (09716/64130)         Paraffin (81519)         US (15525)  8' posterior/lateral elbow, 50%, 1.4w   8' lat elbow, 50%, 1.0w    Iontophoresis (46068)         Hot Pack    8' elbow     Cold Pack                  INTERVENTIONS:         Therapeutic Exercise (25105)         UBE  5'  8' increase elbow pain                                Therapeutic Activity (36737)                                             Manual Therapy (24329)  Radial musc STM  Forearm stretches  Radial musc STM  Forearm stretches Radial musc STM, nerve glides, forearm stretches                      Neuromuscular Reeducation (12905)                           ADL Training (95238)                           HEP Training/Review Forearm stretches, elbow straight Review  Chin tuck  Doorway ant shoulder stretch Review of HEP; discussed further care from surgeon as needed Review of HEP and timeframe    Towel wrap at night as flexion block D/C towel wrap  Nerve glides using corner w/digits extension      Avoid resting on elbow and forearm        Splinting         Lcode:         Orthotic Mgmt, Subsequent Enc (59981)         Orthotic Mgmt & Training (77945)                  Other: Pt shown proper CF brace positioning                                              Therapeutic Exercise & NMR:  [] (84334) Provided verbal/tactile cueing for activities related to strengthening, flexibility, endurance, ROM  for improvements in scapular, scapulothoracic and UE control with self care, reaching, carrying, lifting, house/yardwork, driving/computer work.    [] (67093) Provided verbal/tactile cueing for activities related to improving balance, coordination, kinesthetic sense, posture, motor skill, proprioception  to assist with  scapular, scapulothoracic and UE control with self care, reaching, carrying, lifting, house/yardwork, driving/computer work.     Therapeutic Activities & NMR:    [] (46724 or 38940) Provided verbal/tactile cueing for activities related to improving balance, coordination, kinesthetic sense, posture, motor skill, proprioception and motor activation to allow for proper function of scapular, scapulothoracic and UE control with self care, carrying, lifting, driving/computer work    Home Exercise Program:    [] (79199) Reviewed/Progressed HEP activities related to strengthening, flexibility, endurance, ROM of scapular, scapulothoracic and UE control with self care, reaching, carrying, lifting, house/yardwork, driving/computer work  [] (47861) Reviewed/Progressed HEP activities related to improving balance, coordination, kinesthetic sense, posture, motor skill, proprioception of scapular, scapulothoracic and UE control with self care, reaching, carrying, lifting, house/yardwork, driving/computer work      Manual Treatments:  PROM / STM / Oscillations-Mobs:  G-I, II, III, IV (PA's, Inf., Post.)  [] (41434) Provided manual therapy to mobilize soft tissue/joints of cervical/CT, scapular GHJ and UE for the purpose of modulating pain, promoting relaxation,  increasing ROM, reducing/eliminating soft tissue swelling/inflammation/restriction, improving soft tissue extensibility and allowing for proper ROM for normal function with self care, reaching, carrying, lifting, house/yardwork, driving/computer work    ADL Training:  [] (82136) Provided self-care/home management training related to activities of daily living and compensatory training, and/or use of adaptive equipment      Charges:  Timed Code Treatment Minutes: 25   Total Treatment Minutes: 25   Worker's Comp: Time In/Time Out     [] EVAL (LOW) 61033 (typically 20 minutes face-to-face)    [] EVAL (MOD) 53229 (typically 30 minutes face-to-face)  [] EVAL (HIGH) 11909 (typically 45 minutes face-to-face)  [] OT Re-eval (94164)       [x] Jenifer [phone]) x 2     [] KHVTV(91310)  [] NMR (82017) x      [] Estim (attended) (21258)   [] Manual ([id number]) x      [] US (99478)  [] TA () x      [] Paraffin (19474)  [] ADL  ([phone]) x     [] Splint/L code:    [] Estim (unattended) (22 595509)  [] Fluidotherapy (41061)  [] Other:      ASSESSMENT: Showing progress with new medication and new HEP    GOALS: Patient stated goal: Get rid of the pain, decreased numbness    [] Progressing: [] Met: [] Not Met: [] Adjusted     Therapist goals for Patient:   Short Term Goals: To be achieved in: 2 weeks  1. Independent in HEP and progression per patient tolerance, in order to prevent re-injury. [] Progressing: [x] Met: [] Not Met: [] Adjusted   2. Patient will have a decrease in pain to facilitate improvement in movement, function, and ADLs as indicated by Functional Deficits. [] Progressing: [x] Met: [] Not Met: [] Adjusted     Long Term Goals to be achieved in 6 weeks (through 10/26/22), including patient directed goals to address patient identified performance deficits:  1) Pt to be independent in graded HEP progression with a good level of effort and compliance. [x] Progressing: [] Met: [] Not Met: [] Adjusted   2) Pt to report a score of </= 80  on the FOTO questionnaire for increased performance with carrying, moving, and handling objects. [] Progressing: [] Met: [x] Not Met: [] Adjusted   3) Pt will demonstrate increased strength to by 10# pain free for improved independence with heavier work and household tasks. .  [] Progressing: [x] Met: [] Not Met: [] Adjusted   4) Pt will have a decrease in pain to 2/10 to facilitate return to normal functional use patterns during day. [] Progressing: [x] Met: [] Not Met: [] Adjusted         Overall Progression Towards Functional Goals/Treatment Progress Update:  [x] Patient is progressing as expected towards functional goals listed. [] Progression is slowed due to complexities/impairments listed. [] Progression has been slowed due to co-morbidities. [] Plan just implemented, too soon to assess goals progression <30 days  [] Goals require adjustment due to lack of progress  [] Patient is not progressing as expected and requires additional follow up with physician  [] All goals are met  [] Other:     Prognosis for POC: [x] Good [] Fair  [] Poor    Patient requires continued skilled intervention: [] Yes  [x] No    Treatment/Activity Tolerance:  [x] Patient able to complete treatment  [] Patient limited by fatigue  [] Patient limited by pain    [] Patient limited by other medical complications  [] Other:                  PLAN: Pt will continue Voltaren as prescribed and continue HEP as desired  [] Continue per plan of care [] Alter current plan (see comments above)  [] Plan of care initiated [] Hold pending MD visit [x] Discharge      Electronically signed by:  Anna Cho OT OTR/L, SHEILA [id number]      Note: If patient does not return for scheduled/ recommended follow up visits, this note will serve as a discharge from care along with most recent update on progress.

## 2022-11-08 ENCOUNTER — TELEPHONE (OUTPATIENT)
Dept: SURGERY | Age: 47
End: 2022-11-08

## 2022-11-08 DIAGNOSIS — L05.91 INFECTED PILONIDAL CYST: Primary | ICD-10-CM

## 2022-11-08 RX ORDER — AMOXICILLIN AND CLAVULANATE POTASSIUM 875; 125 MG/1; MG/1
1 TABLET, FILM COATED ORAL 2 TIMES DAILY
Qty: 20 TABLET | Refills: 0 | Status: SHIPPED | OUTPATIENT
Start: 2022-11-08 | End: 2022-11-18

## 2022-11-08 NOTE — TELEPHONE ENCOUNTER
LM informing patient that MD would like for you to call office and make a appt but did  call in Augmentin 875mg take 1 tablet BID x 10 days was called into your pharmacy. This will be the last refill until seen again.

## 2023-01-30 ENCOUNTER — TELEPHONE (OUTPATIENT)
Dept: PRIMARY CARE CLINIC | Age: 48
End: 2023-01-30

## 2023-01-30 NOTE — TELEPHONE ENCOUNTER
Please let patient know he is overdue for a physical. Last physical July 2021. Please encourage him to schedule in near future (does not need to be urgent- but within next few weeks would be great). Thanks!

## 2023-02-08 ENCOUNTER — OFFICE VISIT (OUTPATIENT)
Dept: PRIMARY CARE CLINIC | Age: 48
End: 2023-02-08
Payer: MEDICAID

## 2023-02-08 VITALS
BODY MASS INDEX: 28.7 KG/M2 | WEIGHT: 211.6 LBS | SYSTOLIC BLOOD PRESSURE: 124 MMHG | DIASTOLIC BLOOD PRESSURE: 81 MMHG | TEMPERATURE: 98.1 F | HEART RATE: 90 BPM

## 2023-02-08 DIAGNOSIS — Z12.11 COLON CANCER SCREENING: ICD-10-CM

## 2023-02-08 DIAGNOSIS — Z00.00 ANNUAL PHYSICAL EXAM: Primary | ICD-10-CM

## 2023-02-08 DIAGNOSIS — Z11.4 ENCOUNTER FOR SCREENING FOR HIV: ICD-10-CM

## 2023-02-08 PROCEDURE — 99396 PREV VISIT EST AGE 40-64: CPT | Performed by: NURSE PRACTITIONER

## 2023-02-08 PROCEDURE — G8484 FLU IMMUNIZE NO ADMIN: HCPCS | Performed by: NURSE PRACTITIONER

## 2023-02-08 SDOH — ECONOMIC STABILITY: FOOD INSECURITY: WITHIN THE PAST 12 MONTHS, YOU WORRIED THAT YOUR FOOD WOULD RUN OUT BEFORE YOU GOT MONEY TO BUY MORE.: NEVER TRUE

## 2023-02-08 SDOH — ECONOMIC STABILITY: INCOME INSECURITY: HOW HARD IS IT FOR YOU TO PAY FOR THE VERY BASICS LIKE FOOD, HOUSING, MEDICAL CARE, AND HEATING?: NOT HARD AT ALL

## 2023-02-08 SDOH — ECONOMIC STABILITY: HOUSING INSECURITY
IN THE LAST 12 MONTHS, WAS THERE A TIME WHEN YOU DID NOT HAVE A STEADY PLACE TO SLEEP OR SLEPT IN A SHELTER (INCLUDING NOW)?: NO

## 2023-02-08 SDOH — ECONOMIC STABILITY: FOOD INSECURITY: WITHIN THE PAST 12 MONTHS, THE FOOD YOU BOUGHT JUST DIDN'T LAST AND YOU DIDN'T HAVE MONEY TO GET MORE.: NEVER TRUE

## 2023-02-08 ASSESSMENT — ENCOUNTER SYMPTOMS
NAUSEA: 0
COUGH: 0
WHEEZING: 0
ABDOMINAL PAIN: 0
SORE THROAT: 0
SHORTNESS OF BREATH: 0

## 2023-02-08 ASSESSMENT — PATIENT HEALTH QUESTIONNAIRE - PHQ9
SUM OF ALL RESPONSES TO PHQ QUESTIONS 1-9: 0
SUM OF ALL RESPONSES TO PHQ QUESTIONS 1-9: 0
SUM OF ALL RESPONSES TO PHQ9 QUESTIONS 1 & 2: 0
SUM OF ALL RESPONSES TO PHQ QUESTIONS 1-9: 0
2. FEELING DOWN, DEPRESSED OR HOPELESS: 0
1. LITTLE INTEREST OR PLEASURE IN DOING THINGS: 0
SUM OF ALL RESPONSES TO PHQ QUESTIONS 1-9: 0
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
